# Patient Record
Sex: MALE | Race: BLACK OR AFRICAN AMERICAN | Employment: UNEMPLOYED | URBAN - METROPOLITAN AREA
[De-identification: names, ages, dates, MRNs, and addresses within clinical notes are randomized per-mention and may not be internally consistent; named-entity substitution may affect disease eponyms.]

---

## 2022-03-31 ENCOUNTER — DOCUMENTATION ONLY (OUTPATIENT)
Dept: CARDIOLOGY CLINIC | Age: 52
End: 2022-03-31

## 2022-03-31 NOTE — PROGRESS NOTES
Records requested from [facility] for appointment 4/11/2022 with Dr. Maria Victoria Valente

## 2022-04-11 ENCOUNTER — OFFICE VISIT (OUTPATIENT)
Dept: CARDIOLOGY CLINIC | Age: 52
End: 2022-04-11
Payer: COMMERCIAL

## 2022-04-11 VITALS
BODY MASS INDEX: 29.9 KG/M2 | HEIGHT: 74 IN | OXYGEN SATURATION: 99 % | HEART RATE: 61 BPM | DIASTOLIC BLOOD PRESSURE: 80 MMHG | WEIGHT: 233 LBS | SYSTOLIC BLOOD PRESSURE: 120 MMHG

## 2022-04-11 DIAGNOSIS — U07.1 COVID-19: ICD-10-CM

## 2022-04-11 DIAGNOSIS — Z09 HOSPITAL DISCHARGE FOLLOW-UP: ICD-10-CM

## 2022-04-11 DIAGNOSIS — R06.09 EXERTIONAL DYSPNEA: Primary | ICD-10-CM

## 2022-04-11 PROCEDURE — 93000 ELECTROCARDIOGRAM COMPLETE: CPT | Performed by: STUDENT IN AN ORGANIZED HEALTH CARE EDUCATION/TRAINING PROGRAM

## 2022-04-11 PROCEDURE — 99204 OFFICE O/P NEW MOD 45 MIN: CPT | Performed by: STUDENT IN AN ORGANIZED HEALTH CARE EDUCATION/TRAINING PROGRAM

## 2022-04-11 RX ORDER — DOCUSATE SODIUM 100 MG/1
100 CAPSULE, LIQUID FILLED ORAL 2 TIMES DAILY
COMMUNITY

## 2022-04-11 RX ORDER — BISMUTH SUBSALICYLATE 262 MG
1 TABLET,CHEWABLE ORAL DAILY
COMMUNITY

## 2022-04-11 NOTE — PROGRESS NOTES
Breann Dai is a 45 y.o. male    Visit Vitals  /80 (BP 1 Location: Left upper arm, BP Patient Position: Sitting, BP Cuff Size: Adult)   Pulse 61   Ht 6' 2\" (1.88 m)   Wt 233 lb (105.7 kg)   SpO2 99%   BMI 29.92 kg/m²       Chief Complaint   Patient presents with    New Patient    Other     CARDIAC ISSUES       Chest pain NO  SOB NO  Dizziness NO  Swelling LEGS  Recent hospital visit [facility], 2/15/22-2/25/22, PNEUMONIA, COVID. Refills NO  COVID VACCINE STATUS YES  HAD COVID?  YES

## 2022-04-11 NOTE — PROGRESS NOTES
[facility] [address], [address] [facility], [address] [place] [address]    Office [phone],[facility] [phone]           [facility] Ryan is a 45 y.o. male presents to the office for follow-up evaluation      Assessment/Recommendations:      ICD-10-CM ICD-9-CM    1. Exertional dyspnea  R06.00 786.09 LIPID PANEL      HEMOGLOBIN A1C WITH EAG      METABOLIC PANEL, COMPREHENSIVE      CBC W/O DIFF      ECHO ADULT COMPLETE      NUCLEAR CARDIAC STRESS TEST   2. Hospital discharge follow-up  Z09 V67.59 AMB POC EKG ROUTINE W/ 12 LEADS, INTER & REP   3. COVID-19  U07.1 079.89        Exertional dyspnea  Covid-19 1/2022    -Recommend to proceed with echocardiogram and exercise Cardiolite. If patient is unable to exercise we will proceed with Mayte Goodwin. -CBC, CMP, lipids, hemoglobin A1c      Primary Care Physician- None    Follow-up after completion above testing        Subjective:  45 y.o. presents to the office Eva Echevarria of care. Patient was concern for congestive heart failure symptoms. Patient was admitted to [facility] earlier this year with COVID-19 pneumonia. He was discharged on oxygen due to hypoxic respiratory failure. During his admission his chest x-rays demonstrated cardiomegaly with vascular congestion. Since hospital discharge he continues to have exertional dyspnea. He is without any chest pain or chest pressure symptoms. He will occasionally will have intermittent lower extremity edema. He reports no critical medical history. Not on routine medical therapy. No smoking history. He does report family history of heart disease. He is unclear of his father's diagnosis but he reports having a history of heart issues. Patient has never had a stress test or prior echocardiogram to his knowledge. No past medical history on file. No past surgical history on file.       Current Outpatient Medications:     docusate sodium (Colace) 100 mg capsule, Take 100 mg by mouth two (2) times a day., Disp: , Rfl:     multivitamin (ONE A DAY) tablet, Take 1 Tablet by mouth daily. , Disp: , Rfl:     No Known Allergies     No family history on file. Social History     Tobacco Use    Smoking status: Never Smoker    Smokeless tobacco: Never Used   Substance Use Topics    Alcohol use: Not on file    Drug use: Not on file       Review of Symptoms:  Pertinent Positive: Exertional dyspnea  Pertinent Negative: Chest pain, chest pressure, orthopnea, PND  All Other systems reviewed and are negative for a Comprehensive ROS (10+)    Physical Exam    Blood pressure 120/80, pulse 61, height 6' 2\" (1.88 m), weight 233 lb (105.7 kg), SpO2 99 %. Constitutional:  well-developed and well-nourished. No distress. HENT: Normocephalic. Eyes: No scleral icterus. Neck:  Neck supple. No JVD present. Pulmonary/Chest: Effort normal and breath sounds normal. No respiratory distress, wheezes or rales. Cardiovascular: Normal rate, regular rhythm, S1 S2 . Exam reveals no gallop and no friction rub. No murmur heard. No edema. Extremities:  Normal muscle tone  Abdominal:   No abnormal distension. Neurological:  Moving all extremities, cranial nerves appear grossly intact. Skin: Skin is not cold. Not diaphoretic. No erythema. Psychiatric:  Grossly normal mood and affect. Intact insight. Objective Data:     Investigations personally reviewed and interpreted    EC2022-sinus bradycardia, otherwise normal electrocardiogram          Investigations reviewed     Reviewed discharge paperwork dated 2022  During his hospitalization he had radiographic evidence of cardiomegaly with mild pulmonary vascular congestion  Sodium 140, K3.9, chloride 105, bicarb 33, BUN 11, creatinine 1.07  AST 22 [id number]  Hemoglobin 12.8, white count 5, platelets [id number] Gloria Zuniga DO          ATTENTION:   This medical record was transcribed using an electronic medical records/speech recognition system. Although proofread, it may and can contain electronic, spelling and other errors. Corrections may be executed at a later time. Please feel free to contact us for any clarifications as needed.

## 2022-04-21 ENCOUNTER — TELEPHONE (OUTPATIENT)
Dept: CARDIOLOGY CLINIC | Age: 52
End: 2022-04-21

## 2022-04-21 NOTE — TELEPHONE ENCOUNTER
Called patient's wife advised per   Dr Vilma Tao when he gets his stress test and echo that will address his symptoms. I informed her these were scheduled for 5/10/22. Verbalized understanding.

## 2022-04-21 NOTE — TELEPHONE ENCOUNTER
Please see below message patient's wife called ID verified X2 he is not aware that she was calling but she is very concerned about her . She stated he did not tell the truth when he was seen at [address] 4/11/22. Patient does have swelling in his legs ankles and feet. He has SOB with & without activity. Patient is to use his O2 at 2 LPM but patient does not want to wear it. When sleeping patient has to use several pillows due to SOB. Patient has an appt with PCP on 5/12/22 to establish care. Patient had labs drawn on 4/11/22 and has an Echo & Exercise stress test scheduled for 5/10/22    Please advise.

## 2022-04-21 NOTE — TELEPHONE ENCOUNTER
Patient's wife called to inform the nurse that during the patient's last visit when the patient was asked by the doctor if he had any symptoms he denied having any symptoms and the wife stated that is not the true.   The wife is concerned because   He is having symptoms, he is fatigued, leg swelling, shortness of breath, patient has to sleep on two to three pillows because he has trouble breathing, he gets dark circles under his eyes, clammy skin, the wife also stated he was told by the doctor in the hospital  (while in for covid) that he has a problem with his liver, heart, prostate and kidneys, please advise      [facility]  [phone]

## 2022-05-09 ENCOUNTER — TELEPHONE (OUTPATIENT)
Dept: CARDIOLOGY CLINIC | Age: 52
End: 2022-05-09

## 2022-05-09 NOTE — TELEPHONE ENCOUNTER
ID verified per protocol x2.  Patient's wife confirmed for nuclear stress test appointment reminder including date, time, location, prep, and duration of test. Patient's wife verbalized understanding and agrees with prep/test.

## 2022-05-10 ENCOUNTER — ANCILLARY PROCEDURE (OUTPATIENT)
Dept: CARDIOLOGY CLINIC | Age: 52
End: 2022-05-10
Payer: MEDICAID

## 2022-05-10 ENCOUNTER — ANCILLARY PROCEDURE (OUTPATIENT)
Dept: CARDIOLOGY CLINIC | Age: 52
End: 2022-05-10

## 2022-05-10 VITALS — HEIGHT: 74 IN | BODY MASS INDEX: 29.9 KG/M2 | WEIGHT: 233 LBS

## 2022-05-10 VITALS
SYSTOLIC BLOOD PRESSURE: 122 MMHG | BODY MASS INDEX: 29.9 KG/M2 | WEIGHT: 233 LBS | HEIGHT: 74 IN | DIASTOLIC BLOOD PRESSURE: 78 MMHG

## 2022-05-10 DIAGNOSIS — R06.09 EXERTIONAL DYSPNEA: ICD-10-CM

## 2022-05-10 PROCEDURE — A9500 TC99M SESTAMIBI: HCPCS | Performed by: STUDENT IN AN ORGANIZED HEALTH CARE EDUCATION/TRAINING PROGRAM

## 2022-05-10 PROCEDURE — 93306 TTE W/DOPPLER COMPLETE: CPT | Performed by: STUDENT IN AN ORGANIZED HEALTH CARE EDUCATION/TRAINING PROGRAM

## 2022-05-10 PROCEDURE — 78452 HT MUSCLE IMAGE SPECT MULT: CPT | Performed by: STUDENT IN AN ORGANIZED HEALTH CARE EDUCATION/TRAINING PROGRAM

## 2022-05-10 PROCEDURE — 93015 CV STRESS TEST SUPVJ I&R: CPT | Performed by: STUDENT IN AN ORGANIZED HEALTH CARE EDUCATION/TRAINING PROGRAM

## 2022-05-10 RX ORDER — TETRAKIS(2-METHOXYISOBUTYLISOCYANIDE)COPPER(I) TETRAFLUOROBORATE 1 MG/ML
10 INJECTION, POWDER, LYOPHILIZED, FOR SOLUTION INTRAVENOUS ONCE
Status: COMPLETED | OUTPATIENT
Start: 2022-05-10 | End: 2022-05-10

## 2022-05-10 RX ORDER — TETRAKIS(2-METHOXYISOBUTYLISOCYANIDE)COPPER(I) TETRAFLUOROBORATE 1 MG/ML
30 INJECTION, POWDER, LYOPHILIZED, FOR SOLUTION INTRAVENOUS ONCE
Status: COMPLETED | OUTPATIENT
Start: 2022-05-10 | End: 2022-05-10

## 2022-05-10 RX ADMIN — TETRAKIS(2-METHOXYISOBUTYLISOCYANIDE)COPPER(I) TETRAFLUOROBORATE 7.8 MILLICURIE: 1 INJECTION, POWDER, LYOPHILIZED, FOR SOLUTION INTRAVENOUS at 09:25

## 2022-05-10 RX ADMIN — TETRAKIS(2-METHOXYISOBUTYLISOCYANIDE)COPPER(I) TETRAFLUOROBORATE 25.5 MILLICURIE: 1 INJECTION, POWDER, LYOPHILIZED, FOR SOLUTION INTRAVENOUS at 10:35

## 2022-05-11 LAB
ECHO AO ASC DIAM: 3.5 CM
ECHO AO ASCENDING AORTA INDEX: 1.51 CM/M2
ECHO AO ROOT DIAM: 3.5 CM
ECHO AO ROOT INDEX: 1.51 CM/M2
ECHO AV AREA PEAK VELOCITY: 3.2 CM2
ECHO AV AREA VTI: 3.4 CM2
ECHO AV AREA/BSA PEAK VELOCITY: 1.4 CM2/M2
ECHO AV AREA/BSA VTI: 1.5 CM2/M2
ECHO AV MEAN GRADIENT: 4 MMHG
ECHO AV MEAN VELOCITY: 0.9 M/S
ECHO AV PEAK GRADIENT: 7 MMHG
ECHO AV PEAK VELOCITY: 1.4 M/S
ECHO AV VELOCITY RATIO: 0.71
ECHO AV VTI: 27.6 CM
ECHO EST RA PRESSURE: 3 MMHG
ECHO LA DIAMETER INDEX: 1.94 CM/M2
ECHO LA DIAMETER: 4.5 CM
ECHO LA TO AORTIC ROOT RATIO: 1.29
ECHO LA VOL 2C: 52 ML (ref 18–58)
ECHO LA VOL 4C: 69 ML (ref 18–58)
ECHO LA VOL BP: 67 ML (ref 18–58)
ECHO LA VOL/BSA BIPLANE: 29 ML/M2 (ref 16–34)
ECHO LA VOLUME AREA LENGTH: 71 ML
ECHO LA VOLUME INDEX A2C: 22 ML/M2 (ref 16–34)
ECHO LA VOLUME INDEX A4C: 30 ML/M2 (ref 16–34)
ECHO LA VOLUME INDEX AREA LENGTH: 31 ML/M2 (ref 16–34)
ECHO LV E' LATERAL VELOCITY: 9 CM/S
ECHO LV E' SEPTAL VELOCITY: 8 CM/S
ECHO LV EDV A2C: 137 ML
ECHO LV EDV A4C: 150 ML
ECHO LV EDV BP: 147 ML (ref 67–155)
ECHO LV EDV INDEX A4C: 65 ML/M2
ECHO LV EDV INDEX BP: 63 ML/M2
ECHO LV EDV NDEX A2C: 59 ML/M2
ECHO LV EJECTION FRACTION A2C: 55 %
ECHO LV EJECTION FRACTION A4C: 62 %
ECHO LV EJECTION FRACTION BIPLANE: 59 % (ref 55–100)
ECHO LV ESV A2C: 62 ML
ECHO LV ESV A4C: 56 ML
ECHO LV ESV BP: 60 ML (ref 22–58)
ECHO LV ESV INDEX A2C: 27 ML/M2
ECHO LV ESV INDEX A4C: 24 ML/M2
ECHO LV ESV INDEX BP: 26 ML/M2
ECHO LV FRACTIONAL SHORTENING: 30 % (ref 28–44)
ECHO LV INTERNAL DIMENSION DIASTOLE INDEX: 1.9 CM/M2
ECHO LV INTERNAL DIMENSION DIASTOLIC: 4.4 CM (ref 4.2–5.9)
ECHO LV INTERNAL DIMENSION SYSTOLIC INDEX: 1.34 CM/M2
ECHO LV INTERNAL DIMENSION SYSTOLIC: 3.1 CM
ECHO LV IVSD: 1 CM (ref 0.6–1)
ECHO LV MASS 2D: 147.8 G (ref 88–224)
ECHO LV MASS INDEX 2D: 63.7 G/M2 (ref 49–115)
ECHO LV POSTERIOR WALL DIASTOLIC: 1 CM (ref 0.6–1)
ECHO LV RELATIVE WALL THICKNESS RATIO: 0.45
ECHO LVOT AREA: 4.2 CM2
ECHO LVOT AV VTI INDEX: 0.8
ECHO LVOT DIAM: 2.3 CM
ECHO LVOT MEAN GRADIENT: 2 MMHG
ECHO LVOT PEAK GRADIENT: 4 MMHG
ECHO LVOT PEAK VELOCITY: 1 M/S
ECHO LVOT STROKE VOLUME INDEX: 39.4 ML/M2
ECHO LVOT SV: 91.4 ML
ECHO LVOT VTI: 22 CM
ECHO MV A VELOCITY: 0.84 M/S
ECHO MV AREA PHT: 2.8 CM2
ECHO MV E DECELERATION TIME (DT): 274.1 MS
ECHO MV E VELOCITY: 0.66 M/S
ECHO MV E/A RATIO: 0.79
ECHO MV E/E' LATERAL: 7.33
ECHO MV E/E' RATIO (AVERAGED): 7.79
ECHO MV E/E' SEPTAL: 8.25
ECHO MV PRESSURE HALF TIME (PHT): 79.5 MS
ECHO RIGHT VENTRICULAR SYSTOLIC PRESSURE (RVSP): 30 MMHG
ECHO RV FREE WALL PEAK S': 10 CM/S
ECHO RV INTERNAL DIMENSION: 3.6 CM
ECHO RV TAPSE: 2 CM (ref 1.7–?)
ECHO TV REGURGITANT MAX VELOCITY: 2.62 M/S
ECHO TV REGURGITANT PEAK GRADIENT: 27 MMHG
NUC STRESS EJECTION FRACTION: 50 %
STRESS BASELINE DIAS BP: 78 MMHG
STRESS BASELINE HR: 65 BPM
STRESS BASELINE ST DEPRESSION: 0 MM
STRESS BASELINE SYS BP: 122 MMHG
STRESS O2 SAT PEAK: 92 %
STRESS O2 SAT REST: 100 %
STRESS PEAK DIAS BP: 80 MMHG
STRESS PEAK SYS BP: 140 MMHG
STRESS PERCENT HR ACHIEVED: 75 %
STRESS POST PEAK HR: 125 BPM
STRESS RATE PRESSURE PRODUCT: NORMAL BPM*MMHG
STRESS TARGET HR: 166 BPM

## 2022-05-12 ENCOUNTER — OFFICE VISIT (OUTPATIENT)
Dept: PRIMARY CARE CLINIC | Age: 52
End: 2022-05-12
Payer: MEDICAID

## 2022-05-12 VITALS
RESPIRATION RATE: 20 BRPM | SYSTOLIC BLOOD PRESSURE: 124 MMHG | HEIGHT: 74 IN | DIASTOLIC BLOOD PRESSURE: 70 MMHG | TEMPERATURE: 97.4 F | OXYGEN SATURATION: 98 % | BODY MASS INDEX: 30.83 KG/M2 | WEIGHT: 240.2 LBS | HEART RATE: 80 BPM

## 2022-05-12 DIAGNOSIS — Z86.16 HISTORY OF COVID-19: Primary | ICD-10-CM

## 2022-05-12 DIAGNOSIS — Z99.81 REQUIRES CONTINUOUS AT HOME SUPPLEMENTAL OXYGEN: ICD-10-CM

## 2022-05-12 PROCEDURE — 99203 OFFICE O/P NEW LOW 30 MIN: CPT | Performed by: FAMILY MEDICINE

## 2022-05-12 NOTE — PROGRESS NOTES
1. \"Have you been to the ER, urgent care clinic since your last visit? Hospitalized since your last visit? \" No    2. \"Have you seen or consulted any other health care providers outside of the [address] [place] [address] since your last visit? \" No     3. For patients aged 39-70: Has the patient had a colonoscopy / FIT/ Cologuard? No      If the patient is female:    4. For patients aged 41-77: Has the patient had a mammogram within the past 2 years? NA-based on age or sex      11. For patients aged 21-65: Has the patient had a pap smear?  NA - based on age or sex  Visit Vitals  /70 (BP 1 Location: Right upper arm, BP Patient Position: Sitting, BP Cuff Size: Large adult)   Pulse 80   Temp 97.4 °F (36.3 °C) (Temporal)   Resp 20   Ht 6' 2\" (1.88 m)   Wt 240 lb 3.2 oz (109 kg)   SpO2 98% Comment: O2 via NC 2l/min   BMI 30.84 kg/m²     Chief Complaint   Patient presents with   [place] (+) 01/2022

## 2022-05-12 NOTE — PROGRESS NOTES
HPI     Chief Complaint:   Chief Complaint   Patient presents with   [place] (+) 01/2022      Kenya Betancourt is an 45 y.o. male who presents to [facility] care. Medical history significant for:   Patient Active Problem List   Diagnosis Code    History of COVID-19 Z86.16    Requires continuous at home supplemental oxygen Z99.81       Concerns for today:     History of COVID 19: January 2022, hospitalized at [facility]. Was hospitalized for 10 days and now is on continuous O2 at 2L including night-time O2. He is following with Dr. Sotero Ortiz.    Patient says God told him not to take the COVID vaccine so that is not an option for him. He says he is not aware of Pulmonary Rehab or inhalers that could help with his symptoms but admits he has not wanted much intervention. Current medications include:   Current Outpatient Medications   Medication Sig    multivits,Stress Formula-Zinc tablet Take  by mouth daily.  docusate sodium (Colace) 100 mg capsule Take 100 mg by mouth two (2) times a day.  multivitamin (ONE A DAY) tablet Take 1 Tablet by mouth daily. No current facility-administered medications for this visit. Allergies - reviewed:   No Known Allergies      Past Medical History - reviewed:  History reviewed. No pertinent past medical history. Social History - reviewed:  Social History     Tobacco Use    Smoking status: Never Smoker    Smokeless tobacco: Never Used   Vaping Use    Vaping Use: Never used   Substance Use Topics    Alcohol use: Not Currently    Drug use: Never       Past Surgical History - reviewed:  History reviewed. No pertinent surgical history. Family History - reviewed:  History reviewed. No pertinent family history. Immunizations - reviewed: There is no immunization history on file for this patient. Review of Systems   Review of Systems   Constitutional: Negative for chills and fever.    Respiratory: Positive for cough and sputum production. Cardiovascular: Negative for chest pain and palpitations. Neurological: Negative for dizziness and headaches. Objective     Visit Vitals  /70 (BP 1 Location: Right upper arm, BP Patient Position: Sitting, BP Cuff Size: Large adult)   Pulse 80   Temp 97.4 °F (36.3 °C) (Temporal)   Resp 20   Ht 6' 2\" (1.88 m)   Wt 240 lb 3.2 oz (109 kg)   SpO2 98% Comment: O2 via NC 2l/min   BMI 30.84 kg/m²       Physical Exam  Vitals and nursing note reviewed. Constitutional:       General: He is not in acute distress. Cardiovascular:      Rate and Rhythm: Normal rate and regular rhythm. Pulmonary:      Effort: Pulmonary effort is normal. No respiratory distress. Breath sounds: Normal breath sounds. Neurological:      General: No focal deficit present. Mental Status: He is alert and oriented to person, place, and time. Psychiatric:         Mood and Affect: Mood normal.         Behavior: Behavior normal.             Assessment and Plan     Checking baseline labs. Patient followed by Pulmonology and is using continuous oxygen. He is due for physical and will return fasting for this. Diagnoses and all orders for this visit:    1. History of COVID-19  -     CBC W/O DIFF  -     CBC WITH AUTOMATED DIFF  -     METABOLIC PANEL, COMPREHENSIVE    2. Requires continuous at home supplemental oxygen       Follow-up and Dispositions    Return in about 1 month (around 6/12/2022) for annual physical.  Follow-up and Disposition History       I discussed the aforementioned diagnoses with the patient as well as the plan of care. All questions were answered and an AVS was provided.      Alejo Licea MD  [facility]  [address], [place], [address] [place] [address]

## 2022-05-13 LAB
ALBUMIN SERPL-MCNC: 4.2 G/DL (ref 3.8–4.9)
ALBUMIN/GLOB SERPL: 1.6 {RATIO} (ref 1.2–2.2)
ALP SERPL-CCNC: 69 IU/L (ref 44–121)
ALT SERPL-CCNC: 17 IU/L (ref 0–44)
AST SERPL-CCNC: 15 IU/L (ref 0–40)
BASOPHILS # BLD AUTO: 0 X10E3/UL (ref 0–0.2)
BASOPHILS NFR BLD AUTO: 1 %
BILIRUB SERPL-MCNC: 0.3 MG/DL (ref 0–1.2)
BUN SERPL-MCNC: 8 MG/DL (ref 6–24)
BUN/CREAT SERPL: 7 (ref 9–20)
CALCIUM SERPL-MCNC: 10 MG/DL (ref 8.7–10.2)
CHLORIDE SERPL-SCNC: 102 MMOL/L (ref 96–106)
CO2 SERPL-SCNC: 27 MMOL/L (ref 20–29)
CREAT SERPL-MCNC: 1.11 MG/DL (ref 0.76–1.27)
EGFR: 79 ML/MIN/1.73
EOSINOPHIL # BLD AUTO: 0.2 X10E3/UL (ref 0–0.4)
EOSINOPHIL NFR BLD AUTO: 3 %
ERYTHROCYTE [DISTWIDTH] IN BLOOD BY AUTOMATED COUNT: 12.3 % (ref 11.6–15.4)
GLOBULIN SER CALC-MCNC: 2.7 G/DL (ref 1.5–4.5)
GLUCOSE SERPL-MCNC: 111 MG/DL (ref 65–99)
HCT VFR BLD AUTO: 38.5 % (ref 37.5–51)
HGB BLD-MCNC: 13.4 G/DL (ref 13–17.7)
IMM GRANULOCYTES # BLD AUTO: 0 X10E3/UL (ref 0–0.1)
IMM GRANULOCYTES NFR BLD AUTO: 0 %
LYMPHOCYTES # BLD AUTO: 2.5 X10E3/UL (ref 0.7–3.1)
LYMPHOCYTES NFR BLD AUTO: 51 %
MCH RBC QN AUTO: 32.6 PG (ref 26.6–33)
MCHC RBC AUTO-ENTMCNC: 34.8 G/DL (ref 31.5–35.7)
MCV RBC AUTO: 94 FL (ref 79–97)
MONOCYTES # BLD AUTO: 0.5 X10E3/UL (ref 0.1–0.9)
MONOCYTES NFR BLD AUTO: 11 %
NEUTROPHILS # BLD AUTO: 1.6 X10E3/UL (ref 1.4–7)
NEUTROPHILS NFR BLD AUTO: 34 %
PLATELET # BLD AUTO: 172 X10E3/UL (ref 150–450)
POTASSIUM SERPL-SCNC: 4.3 MMOL/L (ref 3.5–5.2)
PROT SERPL-MCNC: 6.9 G/DL (ref 6–8.5)
RBC # BLD AUTO: 4.11 X10E6/UL (ref 4.14–5.8)
SODIUM SERPL-SCNC: 141 MMOL/L (ref 134–144)
WBC # BLD AUTO: 4.8 X10E3/UL (ref 3.4–10.8)

## 2022-05-13 NOTE — PROGRESS NOTES
Alcon Epps,    Your blood work has returned. Your blood counts, kidney function, liver function, and electrolytes are within acceptable ranges. Your glucose was slightly elevated, so let's be mindful of this regarding your diet. Thank you for the privilege to participate in your healthcare.     Dr. Makenzie Antunez

## 2022-05-20 ENCOUNTER — OFFICE VISIT (OUTPATIENT)
Dept: CARDIOLOGY CLINIC | Age: 52
End: 2022-05-20
Payer: MEDICAID

## 2022-05-20 VITALS
WEIGHT: 240 LBS | SYSTOLIC BLOOD PRESSURE: 124 MMHG | DIASTOLIC BLOOD PRESSURE: 78 MMHG | HEART RATE: 60 BPM | OXYGEN SATURATION: 92 % | BODY MASS INDEX: 30.8 KG/M2 | HEIGHT: 74 IN

## 2022-05-20 DIAGNOSIS — Z86.16 HISTORY OF COVID-19: ICD-10-CM

## 2022-05-20 DIAGNOSIS — R06.09 EXERTIONAL DYSPNEA: Primary | ICD-10-CM

## 2022-05-20 PROCEDURE — 99214 OFFICE O/P EST MOD 30 MIN: CPT | Performed by: STUDENT IN AN ORGANIZED HEALTH CARE EDUCATION/TRAINING PROGRAM

## 2022-05-20 NOTE — PROGRESS NOTES
[facility] [address], [address] [facility], [address] [place] [address]    Office [phone],[facility] [phone]           Andrew Ruiz is a 45 y.o. male presents to the office for follow-up evaluation      Assessment/Recommendations:      ICD-10-CM ICD-9-CM    1. Exertional dyspnea  R06.00 786.09    2. History of COVID-19  Z86.16 V12.09        Exertional dyspnea  Covid-19 1/2022    Recent echocardiogram and stress test showed normal LV function without any significant valvular pathology. No exercise-induced ischemia. I suspect his exertional dyspnea is related to his COVID-19 infection. His recent labs showed very mild insulin resistance with a hemoglobin A1c of 5.7. His lipids are very well controlled. Blood pressure is stable. Primary Care Physician- Maryjo Buerger, MD    Follow-up or sooner as needed        Subjective:  45 y.o. presents to the office for follow-up evaluation. Continues to have class II dyspnea symptoms. No ongoing chest pain or chest pressure symptoms. Current Outpatient Medications:     multivits,Stress Formula-Zinc tablet, Take  by mouth daily. , Disp: , Rfl:     docusate sodium (Colace) 100 mg capsule, Take 100 mg by mouth two (2) times a day., Disp: , Rfl:     multivitamin (ONE A DAY) tablet, Take 1 Tablet by mouth daily. , Disp: , Rfl:     No Known Allergies     History reviewed. No pertinent family history.     Social History     Tobacco Use    Smoking status: Never Smoker    Smokeless tobacco: Never Used   Vaping Use    Vaping Use: Never used   Substance Use Topics    Alcohol use: Not Currently    Drug use: Never       Review of Symptoms:  Pertinent Positive: Exertional dyspnea  Pertinent Negative: Chest pain, chest pressure, orthopnea, PND  All Other systems reviewed and are negative for a Comprehensive ROS (10+)    Physical Exam    Blood pressure 124/78, pulse 60, height 6' 2\" (1.88 m), weight 240 lb (108.9 kg), SpO2 92 %.  Constitutional:  well-developed and well-nourished. No distress. HENT: Normocephalic. Eyes: No scleral icterus. Neck:  Neck supple. No JVD present. Pulmonary/Chest: Effort normal and breath sounds normal. No respiratory distress, wheezes or rales. Cardiovascular: Normal rate, regular rhythm, S1 S2 . Exam reveals no gallop and no friction rub. No murmur heard. No edema. Extremities:  Normal muscle tone  Abdominal:   No abnormal distension. Neurological:  Moving all extremities, cranial nerves appear grossly intact. Skin: Skin is not cold. Not diaphoretic. No erythema. Psychiatric:  Grossly normal mood and affect. Intact insight. Objective Data: Investigations personally reviewed and interpreted    EC2022-sinus bradycardia, otherwise normal electrocardiogram          Investigations reviewed     05/10/22    ECHO ADULT COMPLETE 2022    Interpretation Summary    Left Ventricle: Normal left ventricular systolic function with a visually estimated EF of 55 - 60%. Left ventricle size is normal. Normal wall thickness. Normal wall motion. Normal diastolic function.   Aortic Valve: Tricuspid valve.   Mitral Valve: Trace regurgitation.   Tricuspid Valve: Trace regurgitation. Signed by: Lucinda Gan DO on 2022  8:57 PM    05/10/22    NUCLEAR CARDIAC STRESS TEST 2022    Interpretation Summary    Nuclear Findings: Normal left ventricular systolic function post-stress. LVEF measures 50%.   Nuclear Findings: LV perfusion is normal.    Nuclear Findings: Normal pharmacological myocardial perfusion study.   ECG: Resting ECG demonstrates normal sinus rhythm.   Stress Test: The patient walked the treadmill for reaching a max HR of 125, Mets 6.8 before stopping due to fatigue. Protocol switched to [facility]. A pharmacological stress test was performed using lexiscan. Hemodynamics are adequate for diagnosis.  Blood pressure demonstrated a normal response at rest and to stress. The heart rate demonstrated a normal response to stress. The patient's heart rate recovery was normal. The patient reported dyspnea and no chest pain during the stress test.    Signed by: Van Harding DO on 5/11/2022  9:02 PM            Isaias E Chanelle Madrid DO          ATTENTION:   This medical record was transcribed using an electronic medical records/speech recognition system. Although proofread, it may and can contain electronic, spelling and other errors. Corrections may be executed at a later time. Please feel free to contact us for any clarifications as needed.

## 2022-05-20 NOTE — PROGRESS NOTES
Zuly Orozco is a 45 y.o. male    Chief Complaint   Patient presents with    Follow-up     NUC/ECHO 5/10/22, [facility]       Chest pain No    SOB No    Dizziness No    Swelling some in his legs and feet     Refills No    Visit Vitals  /78 (BP 1 Location: Right arm, BP Patient Position: Sitting)   Pulse 60   Ht 6' 2\" (1.88 m)   Wt 240 lb (108.9 kg)   SpO2 92%   BMI 30.81 kg/m²       1. Have you been to the ER, urgent care clinic since your last visit? Hospitalized since your last visit? No    2. Have you seen or consulted any other health care providers outside of the [address] [place] [address] since your last visit? Include any pap smears or colon screening.   No

## 2022-05-20 NOTE — LETTER
5/20/2022    Patient: Tushar Vazquez   YOB: 1970   Date of Visit: 5/20/2022     Rosa Maria Little MD  [facility] [address] [id number]  [address] [facility] [address]    Dear Rosa Maria Little MD,      Thank you for referring Mr. Tushar Vazquez to [address]  for evaluation. My notes for this consultation are attached. If you have questions, please do not hesitate to call me. I look forward to following your patient along with you.       Sincerely,    Shannen Harry, DO

## 2022-06-24 ENCOUNTER — OFFICE VISIT (OUTPATIENT)
Dept: PRIMARY CARE CLINIC | Age: 52
End: 2022-06-24
Payer: MEDICAID

## 2022-06-24 VITALS
RESPIRATION RATE: 20 BRPM | DIASTOLIC BLOOD PRESSURE: 64 MMHG | SYSTOLIC BLOOD PRESSURE: 109 MMHG | HEART RATE: 64 BPM | TEMPERATURE: 97.4 F | HEIGHT: 74 IN | WEIGHT: 242.2 LBS | OXYGEN SATURATION: 98 % | BODY MASS INDEX: 31.08 KG/M2

## 2022-06-24 DIAGNOSIS — Z86.16 HISTORY OF COVID-19: ICD-10-CM

## 2022-06-24 DIAGNOSIS — Z99.81 REQUIRES CONTINUOUS AT HOME SUPPLEMENTAL OXYGEN: ICD-10-CM

## 2022-06-24 DIAGNOSIS — E66.9 OBESITY (BMI 30-39.9): ICD-10-CM

## 2022-06-24 DIAGNOSIS — Z00.00 WELL ADULT EXAM: Primary | ICD-10-CM

## 2022-06-24 DIAGNOSIS — U09.9 COVID-19 LONG HAULER MANIFESTING CHRONIC DYSPNEA: ICD-10-CM

## 2022-06-24 DIAGNOSIS — Z13.6 ENCOUNTER FOR SCREENING FOR CARDIOVASCULAR DISORDERS: ICD-10-CM

## 2022-06-24 DIAGNOSIS — Z11.59 NEED FOR HEPATITIS C SCREENING TEST: ICD-10-CM

## 2022-06-24 DIAGNOSIS — R06.09 COVID-19 LONG HAULER MANIFESTING CHRONIC DYSPNEA: ICD-10-CM

## 2022-06-24 DIAGNOSIS — Z12.5 SCREENING FOR MALIGNANT NEOPLASM OF PROSTATE: ICD-10-CM

## 2022-06-24 DIAGNOSIS — R73.03 PREDIABETES: ICD-10-CM

## 2022-06-24 PROCEDURE — 99396 PREV VISIT EST AGE 40-64: CPT | Performed by: FAMILY MEDICINE

## 2022-06-24 NOTE — PROGRESS NOTES
1. \"Have you been to the ER, urgent care clinic since your last visit? Hospitalized since your last visit? \" No    2. \"Have you seen or consulted any other health care providers outside of the [address] [place] [address] since your last visit? \" No     3. For patients aged 39-70: Has the patient had a colonoscopy / FIT/ Cologuard? No      If the patient is female:    4. For patients aged 41-77: Has the patient had a mammogram within the past 2 years? NA - based on age or sex      11. For patients aged 21-65: Has the patient had a pap smear?  NA - based on age or sex  Visit Vitals  /64 (BP 1 Location: Right upper arm, BP Patient Position: Sitting, BP Cuff Size: Large adult)   Pulse 64   Temp 97.4 °F (36.3 °C) (Temporal)   Resp 20   Ht 6' 2\" (1.88 m)   Wt 242 lb 3.2 oz (109.9 kg)   SpO2 98%   BMI 31.10 kg/m²     Chief Complaint   Patient presents with    Physical

## 2022-06-24 NOTE — PROGRESS NOTES
HPI     Chief Complaint   Patient presents with    Physical       Aj Vance is a 45 y.o. male presenting for well male exam and is also due for follow up care of chronic issues. Aj Vance is willing to do both appointments today and realizes that there may be a co-pay for the follow-up portion of the visit. Specialists: Dr. Irma Angel, Cardiology and Dr. Mayra Manning. \"Lungs are function at 58% capacity\". Occupation: not working since having Manoj Ferrera. 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 Health Maintenance reviewed -  HCV screening-due  PHQ2 Score = negative  PSA: Completing today  Colonoscopy patient declined  Low dose CT scan-not indicated  AAA screening-not indicated  COVID-19 vaccinations: Refused. \"God told me not to get it\"  TDaP: Declines  Shingles: Declines      Acute Problems:     Long haul COVID with dyspnea: patient had COVID 19, hospitalized. He's been dependent on oxygen since then. Follows with Pulmonology, had not had any medication recommendations. He states Dr. Mayra Manning did mention that Mr. Latasha Roman should consider long-term disability given his current state. Prediabetes: Patient states he is working on healthy lifestyle. Diabetes is present on both his maternal and paternal side. Lab Results   Component Value Date/Time    Hemoglobin A1c 5.7 (H) 04/11/2022 11:01 AM      Obesity: Patient is not very active as he is oxygen dependent at this time. Weight Metrics 6/24/2022 5/20/2022 5/12/2022 5/10/2022 5/10/2022 4/11/2022   Weight 242 lb 3.2 oz 240 lb 240 lb 3.2 oz 233 lb 233 lb 233 lb   BMI 31.1 kg/m2 30.81 kg/m2 30.84 kg/m2 29.92 kg/m2 29.92 kg/m2 29.92 kg/m2       Allergies- reviewed  No Known Allergies    Medications- reviewed  Current Outpatient Medications   Medication Sig    multivits,Stress Formula-Zinc tablet Take  by mouth daily.  docusate sodium (Colace) 100 mg capsule Take 100 mg by mouth two (2) times a day.  multivitamin (ONE A DAY) tablet Take 1 Tablet by mouth daily.      No current facility-administered medications for this visit. Immunizations - reviewed: There is no immunization history on file for this patient. Flu: recommended every fall. Review of Systems   Review of Systems   Constitutional: Negative for chills and fever. Eyes: Negative for discharge and redness. Respiratory: Positive for shortness of breath. Cardiovascular: Negative for chest pain and palpitations. Gastrointestinal: Negative for abdominal pain and blood in stool. Musculoskeletal: Negative for falls and myalgias. Skin: Negative for rash. Neurological: Negative for focal weakness and headaches. Endo/Heme/Allergies: Negative for polydipsia. Does not bruise/bleed easily. Psychiatric/Behavioral: Negative for hallucinations and substance abuse. Reviewed PmHx, FmHx, SocHx as well as meds and allergies, updated and dated in the chart. Social History     Tobacco Use    Smoking status: Never Smoker    Smokeless tobacco: Never Used   Vaping Use    Vaping Use: Never used   Substance Use Topics    Alcohol use: Not Currently    Drug use: Never     History reviewed. No pertinent past medical history. Family History   Problem Relation Age of Onset    Diabetes Mother     Diabetes Father           Objective     Physical Exam  Visit Vitals  /64 (BP 1 Location: Right upper arm, BP Patient Position: Sitting, BP Cuff Size: Large adult)   Pulse 64   Temp 97.4 °F (36.3 °C) (Temporal)   Resp 20   Ht 6' 2\" (1.88 m)   Wt 242 lb 3.2 oz (109.9 kg)   SpO2 98%   BMI 31.10 kg/m²       Physical Exam  Vitals and nursing note reviewed. Constitutional:       General: He is not in acute distress. Appearance: Normal appearance. HENT:      Head: Normocephalic and atraumatic.       Right Ear: Tympanic membrane and external ear normal.      Left Ear: Tympanic membrane and external ear normal.      Nose: Nose normal.      Mouth/Throat:      Mouth: Mucous membranes are moist.      Pharynx: No oropharyngeal exudate. Eyes:      Extraocular Movements: Extraocular movements intact. Conjunctiva/sclera: Conjunctivae normal.      Pupils: Pupils are equal, round, and reactive to light. Cardiovascular:      Rate and Rhythm: Normal rate and regular rhythm. Heart sounds: No murmur heard. Pulmonary:      Effort: Pulmonary effort is normal. No respiratory distress. Breath sounds: Normal breath sounds. Abdominal:      General: Bowel sounds are normal. There is no distension. Musculoskeletal:         General: Normal range of motion. Cervical back: Normal range of motion and neck supple. Right lower leg: No edema. Left lower leg: No edema. Skin:     General: Skin is warm. Coloration: Skin is not jaundiced. Neurological:      General: No focal deficit present. Mental Status: He is alert and oriented to person, place, and time. Psychiatric:         Mood and Affect: Mood normal.         Behavior: Behavior normal.               Assessment and Plan     Diagnoses and all orders for this visit:    1. Well adult exam  Comments:  Age-appropriate guidance and health maintenance updated. Patient refuses vaccinations, colonoscopy. Agrees to labs. Orders:  -     CBC W/O DIFF  -     METABOLIC PANEL, COMPREHENSIVE  -     LIPID PANEL  -     PSA W/ REFLX FREE PSA  -     HEPATITIS C AB, RFLX TO QT BY PCR    2. COVID-19 long hauler manifesting chronic dyspnea  Comments:  Patient declines referral to [place] [address] clinic until he figures out long-term disability. I am requesting records from Dr. Torsten Sanchez. 3. History of COVID-19    4. Requires continuous at home supplemental oxygen    5. Obesity (BMI 30-39. 9)  Comments:  I encouraged healthy lifestyle. Ability to exercise is limited due to long-haul COVID. Orders:  -     CBC W/O DIFF  -     METABOLIC PANEL, COMPREHENSIVE    6. Prediabetes  Comments:  Counseled on healthy diet and lifestyle changes.   We will continue to monitor on labs. 7. Need for hepatitis C screening test  -     HEPATITIS C AB, RFLX TO QT BY PCR    8. Encounter for screening for cardiovascular disorders  -     LIPID PANEL    9. Screening for malignant neoplasm of prostate  -     PSA W/ REFLX FREE PSA       Follow-up and Dispositions    Return in about 1 year (around 6/24/2023) for annual physical.         I discussed the aforementioned diagnoses with the patient as well as the plan of care. All questions were answered and an AVS was provided.        Tamanna Barrera MD  [address] [place] [address]

## 2022-06-25 LAB
ALBUMIN SERPL-MCNC: 4.1 G/DL (ref 3.8–4.9)
ALBUMIN/GLOB SERPL: 1.5 {RATIO} (ref 1.2–2.2)
ALP SERPL-CCNC: 73 IU/L (ref 44–121)
ALT SERPL-CCNC: 17 IU/L (ref 0–44)
AST SERPL-CCNC: 20 IU/L (ref 0–40)
BILIRUB SERPL-MCNC: 0.3 MG/DL (ref 0–1.2)
BUN SERPL-MCNC: 11 MG/DL (ref 6–24)
BUN/CREAT SERPL: 10 (ref 9–20)
CALCIUM SERPL-MCNC: 9.8 MG/DL (ref 8.7–10.2)
CHLORIDE SERPL-SCNC: 104 MMOL/L (ref 96–106)
CHOLEST SERPL-MCNC: 173 MG/DL (ref 100–199)
CO2 SERPL-SCNC: 25 MMOL/L (ref 20–29)
CREAT SERPL-MCNC: 1.07 MG/DL (ref 0.76–1.27)
EGFR: 82 ML/MIN/1.73
ERYTHROCYTE [DISTWIDTH] IN BLOOD BY AUTOMATED COUNT: 12.1 % (ref 11.6–15.4)
GLOBULIN SER CALC-MCNC: 2.7 G/DL (ref 1.5–4.5)
GLUCOSE SERPL-MCNC: 124 MG/DL (ref 65–99)
HCT VFR BLD AUTO: 38.9 % (ref 37.5–51)
HCV AB S/CO SERPL IA: <0.1 S/CO RATIO (ref 0–0.9)
HCV AB SERPL QL IA: NORMAL
HDLC SERPL-MCNC: 44 MG/DL
HGB BLD-MCNC: 13.8 G/DL (ref 13–17.7)
LDLC SERPL CALC-MCNC: 94 MG/DL (ref 0–99)
MCH RBC QN AUTO: 32.4 PG (ref 26.6–33)
MCHC RBC AUTO-ENTMCNC: 35.5 G/DL (ref 31.5–35.7)
MCV RBC AUTO: 91 FL (ref 79–97)
PLATELET # BLD AUTO: 174 X10E3/UL (ref 150–450)
POTASSIUM SERPL-SCNC: 4.3 MMOL/L (ref 3.5–5.2)
PROT SERPL-MCNC: 6.8 G/DL (ref 6–8.5)
PSA SERPL-MCNC: 1.1 NG/ML (ref 0–4)
RBC # BLD AUTO: 4.26 X10E6/UL (ref 4.14–5.8)
REFLEX CRITERIA: NORMAL
SODIUM SERPL-SCNC: 142 MMOL/L (ref 134–144)
TRIGL SERPL-MCNC: 207 MG/DL (ref 0–149)
VLDLC SERPL CALC-MCNC: 35 MG/DL (ref 5–40)
WBC # BLD AUTO: 5 X10E3/UL (ref 3.4–10.8)

## 2022-06-27 NOTE — PROGRESS NOTES
Pls call patient. Blood counts normal.  Kidney function and liver function normal. Blood sugar is slightly high, consistent with prediabetes, as we know. Prostate cancer screening negative. Cholesterol levels high, risk of heart attack and/or stroke in next 10 years almost at 5%. Please encourage heart healthy diet.  We will monitor this.    -------  The 10-year ASCVD risk score (Angel Benz, et al., 2013) is: 4.8%

## 2022-07-11 ENCOUNTER — TELEPHONE (OUTPATIENT)
Dept: PRIMARY CARE CLINIC | Age: 52
End: 2022-07-11

## 2022-07-11 NOTE — TELEPHONE ENCOUNTER
Pt called and was asking about disability papers that Dr Bronson De La Rosa suppose to be filling out. Trying to get the status update on those, asking for Dr. Bronson De La Rosa to reach out about the papers.

## 2022-08-18 ENCOUNTER — TELEPHONE (OUTPATIENT)
Dept: PRIMARY CARE CLINIC | Age: 52
End: 2022-08-18

## 2022-08-19 ENCOUNTER — OFFICE VISIT (OUTPATIENT)
Dept: PRIMARY CARE CLINIC | Age: 52
End: 2022-08-19
Payer: MEDICAID

## 2022-08-19 VITALS
BODY MASS INDEX: 30.47 KG/M2 | SYSTOLIC BLOOD PRESSURE: 114 MMHG | DIASTOLIC BLOOD PRESSURE: 74 MMHG | HEIGHT: 74 IN | WEIGHT: 237.4 LBS | OXYGEN SATURATION: 99 % | TEMPERATURE: 97.2 F | RESPIRATION RATE: 20 BRPM | HEART RATE: 50 BPM

## 2022-08-19 DIAGNOSIS — Z12.11 SCREENING FOR MALIGNANT NEOPLASM OF COLON: ICD-10-CM

## 2022-08-19 DIAGNOSIS — R51.9 NONINTRACTABLE EPISODIC HEADACHE, UNSPECIFIED HEADACHE TYPE: ICD-10-CM

## 2022-08-19 DIAGNOSIS — U09.9 COVID-19 LONG HAULER MANIFESTING CHRONIC DYSPNEA: Primary | ICD-10-CM
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
## 2022-08-19 DIAGNOSIS — R06.09 COVID-19 LONG HAULER MANIFESTING CHRONIC DYSPNEA: Primary | ICD-10-CM

## 2022-08-19 DIAGNOSIS — Z86.16 HISTORY OF COVID-19: ICD-10-CM

## 2022-08-19 DIAGNOSIS — S80.861A INSECT BITE OF RIGHT LOWER EXTREMITY, INITIAL ENCOUNTER: ICD-10-CM

## 2022-08-19 PROBLEM — Z53.20 COLONOSCOPY REFUSED: Status: ACTIVE | Noted: 2022-08-19

## 2022-08-19 PROCEDURE — 99214 OFFICE O/P EST MOD 30 MIN: CPT | Performed by: FAMILY MEDICINE

## 2022-08-19 NOTE — PROGRESS NOTES
HPI     Chief Complaint   Patient presents with    Follow-up          HPI:  Ryan Trujillo is a 45 y.o. male who is oxygen dependent s/p severe COVID 19 infection 2022 requiring hospitalization. Patient would like to move forward with colon cancer screening today. Headaches: small headaches that come and go. Has not noticed a pattern. Randomly occur, typically a few minutes to an hour. Pain is sharp and changes locations. Symptoms are mild per patient. Go away on their own typically, sometimes require tylenol. He does have increased stress overall due to finances and being out of work, but nothing moreso now than previously. Concerns Enlarged heart?: Says records from his pulmonologist said enlarged heart? ECHO May 2022 which showed normal LV size normal with EF 50% on Nuclear stress and 55-60% on ECHO. Per Dr. Guy Ramirez note May 2022:  \"Recent echocardiogram and stress test showed normal LV function without any significant valvular pathology. No exercise-induced ischemia. I suspect his exertional dyspnea is related to his COVID-19 infection. His recent labs showed very mild insulin resistance with a hemoglobin A1c of 5.7. His lipids are very well controlled. Blood pressure is stable. \"    Insect bite: noticed two punctate lesions that appeared to be bite marks about one week ago. One had a central area. Both areas have resolved without fever or body aches. Long Haul COVID w/ Dyspnea: Remains on 2L continuously during the day but sometimes up to 3L at night. January 2022, hospitalized at [facility]. Was hospitalized for 10 days and now is on continuous O2 at 2L including night-time O2. He is following with Dr. Severo Daniel.    Patient says God told him not to take the COVID vaccine so that is not an option for him. He states Dr. Josie Dillard has mentioned Pulmonary Rehab (as have I) or inhalers that could help with his symptoms but admits he has not wanted much intervention.       No Known Allergies    Current Outpatient Medications   Medication Sig    multivits,Stress Formula-Zinc tablet Take  by mouth daily.  docusate sodium (COLACE) 100 mg capsule Take 100 mg by mouth two (2) times a day.  multivitamin (ONE A DAY) tablet Take 1 Tablet by mouth daily. No current facility-administered medications for this visit. Review of Systems   Constitutional:  Negative for chills and fever. Eyes:  Negative for blurred vision and double vision. Respiratory:  Positive for shortness of breath. Negative for cough. Skin:  Negative for itching and rash. Neurological:  Positive for headaches. Negative for dizziness. Reviewed PmHx, FmHx, SocHx as well as meds and allergies, updated and dated in the chart. Objective     Visit Vitals  /74 (BP 1 Location: Left upper arm, BP Patient Position: Sitting, BP Cuff Size: Large adult)   Pulse (!) 50   Temp 97.2 °F (36.2 °C) (Temporal)   Resp 20   Ht 6' 2\" (1.88 m)   Wt 237 lb 6.4 oz (107.7 kg)   SpO2 99%   BMI 30.48 kg/m²     Physical Exam  Vitals and nursing note reviewed. Constitutional:       General: He is not in acute distress. HENT:      Head: Normocephalic and atraumatic. Eyes:      Extraocular Movements: Extraocular movements intact. Conjunctiva/sclera: Conjunctivae normal.      Pupils: Pupils are equal, round, and reactive to light. Cardiovascular:      Rate and Rhythm: Normal rate and regular rhythm. Heart sounds: No murmur heard. Pulmonary:      Effort: Pulmonary effort is normal. No respiratory distress. Comments: Nasal Cannula in place. Neurological:      General: No focal deficit present. Mental Status: He is oriented to person, place, and time. Motor: No weakness. Gait: Gait normal.   Psychiatric:         Mood and Affect: Mood normal.         Behavior: Behavior normal.           Assessment and Plan     Diagnoses and all orders for this visit:    1.  COVID-19 long hauler manifesting chronic dyspnea  Comments:  Continue care with Dr. Catracho Pérez. I am requesting records again today. Referral to [facility], [facility].. Orders:  -     REFERRAL TO PULMONARY REHAB    2. History of COVID-19  Comments:  Jan 2022. Referral to [facility] Orders:  -     REFERRAL TO PULMONARY REHAB    3. Nonintractable episodic headache, unspecified headache type  Comments:  Sounds like tension headache. No red flags per hx or exam. Supportive care encouraged and warning signs discussed. 4. Insect bite of right lower extremity, initial encounter  Comments:  Resolved. Exam normal. No signs of cellulitis. 5. Screening for malignant neoplasm of colon  -     COLOGUARD TEST (FECAL DNA COLORECTAL CANCER SCREENING)         As applicable:  Medication Side Effects and Warnings were discussed with patient. Patient Labs were reviewed and or requested. Patient Past Records were reviewed and or requested. Follow-up and Dispositions    Return in about 1 year (around 8/19/2023) for [address] [place] [address]. I have discussed the diagnosis with the patient and the intended plan as seen in the above orders. The patient has received an after-visit summary and questions were answered concerning future plans. I have discussed medication side effects and warnings with the patient as well.       Kaitlynn Gutierrez MD  [address] [place] [address]

## 2022-08-19 NOTE — PROGRESS NOTES
1. \"Have you been to the ER, urgent care clinic since your last visit? Hospitalized since your last visit? \" No    2. \"Have you seen or consulted any other health care providers outside of the [address] [place] [address] since your last visit? \" No     3. For patients aged 39-70: Has the patient had a colonoscopy / FIT/ Cologuard? No      If the patient is female:    4. For patients aged 41-77: Has the patient had a mammogram within the past 2 years? NA - based on age or sex      11. For patients aged 21-65: Has the patient had a pap smear?  NA - based on age or sex  Visit Vitals  /74 (BP 1 Location: Left upper arm, BP Patient Position: Sitting, BP Cuff Size: Large adult)   Pulse (!) 50   Temp 97.2 °F (36.2 °C) (Temporal)   Resp 20   Ht 6' 2\" (1.88 m)   Wt 237 lb 6.4 oz (107.7 kg)   SpO2 99% Comment: [email or web address] conts   BMI 30.48 kg/m²     Chief Complaint   Patient presents with    Follow-up

## 2022-11-17 ENCOUNTER — APPOINTMENT (OUTPATIENT)
Dept: GENERAL RADIOLOGY | Age: 52
End: 2022-11-17
Attending: EMERGENCY MEDICINE
Payer: MEDICAID

## 2022-11-17 ENCOUNTER — TELEPHONE (OUTPATIENT)
Dept: PRIMARY CARE CLINIC | Age: 52
End: 2022-11-17

## 2022-11-17 ENCOUNTER — HOSPITAL ENCOUNTER (EMERGENCY)
Age: 52
Discharge: HOME | End: 2022-11-17
Attending: EMERGENCY MEDICINE
Payer: MEDICAID

## 2022-11-17 VITALS
DIASTOLIC BLOOD PRESSURE: 75 MMHG | SYSTOLIC BLOOD PRESSURE: 127 MMHG | BODY MASS INDEX: 30.42 KG/M2 | HEIGHT: 74 IN | RESPIRATION RATE: 16 BRPM | TEMPERATURE: 98.7 F | OXYGEN SATURATION: 100 % | HEART RATE: 79 BPM | WEIGHT: 237 LBS

## 2022-11-17 DIAGNOSIS — R50.9 FEVER, UNSPECIFIED FEVER CAUSE: ICD-10-CM

## 2022-11-17 DIAGNOSIS — U07.1 COVID-19: Primary | ICD-10-CM

## 2022-11-17 LAB
FLUAV RNA SPEC QL NAA+PROBE: NOT DETECTED
FLUBV RNA SPEC QL NAA+PROBE: NOT DETECTED
SARS-COV-2, COV2: DETECTED

## 2022-11-17 PROCEDURE — 99283 EMERGENCY DEPT VISIT LOW MDM: CPT

## 2022-11-17 PROCEDURE — 74011250637 HC RX REV CODE- 250/637: Performed by: EMERGENCY MEDICINE

## 2022-11-17 PROCEDURE — 71045 X-RAY EXAM CHEST 1 VIEW: CPT

## 2022-11-17 PROCEDURE — 87636 SARSCOV2 & INF A&B AMP PRB: CPT

## 2022-11-17 RX ORDER — IBUPROFEN 800 MG/1
800 TABLET ORAL ONCE
Status: COMPLETED | OUTPATIENT
Start: 2022-11-17 | End: 2022-11-17

## 2022-11-17 RX ORDER — AZITHROMYCIN 250 MG/1
TABLET, FILM COATED ORAL
Qty: 6 TABLET | Refills: 0 | Status: SHIPPED | OUTPATIENT
Start: 2022-11-17

## 2022-11-17 RX ORDER — ALBUTEROL SULFATE 90 UG/1
2 AEROSOL, METERED RESPIRATORY (INHALATION)
Qty: 6.7 G | Refills: 0 | Status: SHIPPED | OUTPATIENT
Start: 2022-11-17

## 2022-11-17 RX ORDER — DEXTROMETHORPHAN HYDROBROMIDE, GUAIFENESIN 20; 400 MG/20ML; MG/20ML
5 SOLUTION ORAL EVERY 6 HOURS
Qty: 200 ML | Refills: 0 | Status: SHIPPED | OUTPATIENT
Start: 2022-11-17

## 2022-11-17 RX ADMIN — IBUPROFEN 800 MG: 800 TABLET, FILM COATED ORAL at 11:27

## 2022-11-17 NOTE — Clinical Note
[facility] EMERGENCY DEPT  [address] [place] [id number]  [phone]    Work/School Note    Date: 11/17/2022    To Whom It May concern:      Alvarez Beasley was seen and treated today in the emergency room by the following provider(s):  Attending Provider: Jessica Norris MD.      Alvarez Beasley is excused from work/school on 11/17/22. He is clear to return to work/school on 11/18/22.         Sincerely,          Chiara Ramirez MD

## 2022-11-17 NOTE — Clinical Note
[facility] EMERGENCY DEPT  [address] [id number]  [phone]    Work/School Note    Date: 11/17/2022     To Whom It May concern:    John Kelley was evaluated by the following provider(s):  Attending Provider: Slime Coyle MD.   COVID19 virus is suspected. Per the CDC guidelines we recommend home isolation until the following conditions are all met:    1. At least five days have passed since symptoms first appeared and/or had a close exposure,   2. After home isolation for five days, wearing a mask around others for the next five days,  3. At least 24 have passed since last fever without the use of fever-reducing medications and  4.  Symptoms (eg cough, shortness of breath) have improved      Sincerely,          Codey Bolanos MD

## 2022-11-17 NOTE — ED PROVIDER NOTES
EMERGENCY DEPARTMENT HISTORY AND PHYSICAL EXAM      Date: 11/17/2022  Patient Name: Azucena Gunter    History of Presenting Illness     Chief Complaint   Patient presents with    Fever    Abnormal Lab Results       History Provided By: Patient    HPI: Azucena Gunter, 45 y.o. male with a past medical history significant No significant past medical history presents to the ED with chief complaint of Fever and Abnormal Lab Results  . 58-year-old male with a history of chronic lung disease tested positive for COVID-19 at home. He had some low-grade fever. Otherwise no worsening shortness of breath from his baseline usually on 2 L of oxygen. No coughing no nausea or vomiting. No dizziness or passing out. Wanted confirmation. There are no other complaints, changes, or physical findings at this time. PCP: Kiya Amor MD    Current Outpatient Medications   Medication Sig Dispense Refill    azithromycin (Zithromax Z-Damien) 250 mg tablet Take 2 tablet p.o. day 1 then 1 tablet p.o. day 2 through 5 6 Tablet 0    albuterol (PROVENTIL HFA, VENTOLIN HFA, PROAIR HFA) 90 mcg/actuation inhaler Take 2 Puffs by inhalation every four (4) hours as needed for Wheezing. 6.7 g 0    dextromethorphan-guaiFENesin (Robitussin Cough-Chest Dino DM) 5-100 mg/5 mL liqd Take 5 mL by mouth every six (6) hours. 200 mL 0    multivits,Stress Formula-Zinc tablet Take  by mouth daily. docusate sodium (COLACE) 100 mg capsule Take 100 mg by mouth two (2) times a day. multivitamin (ONE A DAY) tablet Take 1 Tablet by mouth daily.          Past History     Past Medical History: cld    Past Surgical History: denies    Family History:  Family History   Problem Relation Age of Onset    Diabetes Mother     Diabetes Father        Social History:  Social History     Tobacco Use    Smoking status: Never    Smokeless tobacco: Never   Vaping Use    Vaping Use: Never used   Substance Use Topics    Alcohol use: Not Currently    Drug use: Never       Allergies:  No Known Allergies      Review of Systems   Review of Systems   Constitutional:  Positive for fever. Negative for chills and fatigue. HENT: Negative. Negative for congestion, ear pain, nosebleeds and sore throat. Eyes: Negative. Negative for pain, discharge and visual disturbance. Respiratory: Negative. Negative for cough, chest tightness and shortness of breath. Cardiovascular: Negative. Negative for chest pain and leg swelling. Gastrointestinal: Negative. Negative for abdominal pain, blood in stool, constipation, diarrhea, nausea and vomiting. Endocrine: Negative. Genitourinary: Negative. Negative for difficulty urinating, dysuria and flank pain. Musculoskeletal: Negative. Negative for back pain and myalgias. Skin: Negative. Negative for rash and wound. Allergic/Immunologic: Negative. Neurological: Negative. Negative for dizziness, syncope, weakness, numbness and headaches. Hematological: Negative. Does not bruise/bleed easily. Psychiatric/Behavioral: Negative. Negative for agitation, confusion, hallucinations and suicidal ideas. All other systems reviewed and are negative. Physical Exam   Physical Exam  Vitals and nursing note reviewed. Constitutional:       General: He is not in acute distress. Appearance: He is normal weight. He is not ill-appearing. HENT:      Head: Normocephalic and atraumatic. Right Ear: External ear normal.      Left Ear: External ear normal.      Nose: Nose normal. No rhinorrhea. Mouth/Throat:      Mouth: Mucous membranes are moist.      Pharynx: Oropharynx is clear. Eyes:      Extraocular Movements: Extraocular movements intact. Conjunctiva/sclera: Conjunctivae normal.      Pupils: Pupils are equal, round, and reactive to light. Cardiovascular:      Rate and Rhythm: Normal rate and regular rhythm. Pulses: Normal pulses. Heart sounds: Normal heart sounds.    Pulmonary:      Effort: Pulmonary effort is normal. No respiratory distress. Breath sounds: Normal breath sounds. Abdominal:      General: Abdomen is flat. Bowel sounds are normal.      Palpations: Abdomen is soft. Musculoskeletal:         General: No tenderness or deformity. Normal range of motion. Cervical back: Normal range of motion and neck supple. Skin:     General: Skin is warm and dry. Capillary Refill: Capillary refill takes less than 2 seconds. Findings: No bruising, lesion or rash. Neurological:      General: No focal deficit present. Mental Status: He is alert and oriented to person, place, and time. Mental status is at baseline. Psychiatric:         Mood and Affect: Mood normal.         Behavior: Behavior normal.         Thought Content: Thought content normal.         Judgment: Judgment normal.       Diagnostic Study Results     Labs -     Recent Results (from the past 12 hour(s))   COVID-19 WITH INFLUENZA A/B    Collection Time: 11/17/22 11:24 AM   Result Value Ref Range    SARS-CoV-2 by PCR DETECTED (A) Not Detected      Influenza A by PCR Not Detected Not Detected      Influenza B by PCR Not Detected Not Detected           Radiologic Studies -   XR CHEST PORT   Final Result      No acute process on portable chest.           CT Results  (Last 48 hours)      None          CXR Results  (Last 48 hours)                 11/17/22 1057  XR CHEST PORT Final result    Impression:      No acute process on portable chest.           Narrative:  EXAM:  XR CHEST PORT       INDICATION: Cough       COMPARISON: none       TECHNIQUE: portable chest AP view       FINDINGS: The cardiac silhouette is within normal limits. The pulmonary   vasculature is within normal limits. The lungs and pleural spaces are clear. Degenerative changes are seen in the   thoracic spine. Medical Decision Making and ED Course   I am the first provider for this patient.     I reviewed the vital signs, available nursing notes, past medical history, past surgical history, family history and social history. Vital Signs-Reviewed the patient's vital signs. Patient Vitals for the past 12 hrs:   Temp Pulse Resp BP SpO2   11/17/22 1041 98.7 °F (37.1 °C) 79 16 127/75 100 %       EKG interpretation:         Records Reviewed: Previous Hospital chart. EMS run report      ED Course:   Initial assessment performed. The patients presenting problems have been discussed, and they are in agreement with the care plan formulated and outlined with them. I have encouraged them to ask questions as they arise throughout their visit. Orders Placed This Encounter    COVID-19 WITH INFLUENZA A/B     Standing Status:   Standing     Number of Occurrences:   1     Order Specific Question:   Is this test for diagnosis or screening? Answer:   Diagnosis of ill patient     Order Specific Question:   Symptomatic for COVID-19 as defined by CDC? Answer:   Yes     Order Specific Question:   Date of Symptom Onset     Answer:   11/17/2022     Order Specific Question:   Hospitalized for COVID-19? Answer:   No     Order Specific Question:   Admitted to ICU for COVID-19? Answer:   No     Order Specific Question:   Employed in healthcare setting? Answer:   No     Order Specific Question:   Resident in a congregate (group) care setting? Answer:   No     Order Specific Question:   Previously tested for COVID-19?      Answer:   No    XR CHEST PORT     Standing Status:   Standing     Number of Occurrences:   1     Order Specific Question:   Reason for Exam     Answer:   cough    Airborne Isolation     Standing Status:   Standing     Number of Occurrences:   1    Contact Isolation     Standing Status:   Standing     Number of Occurrences:   1    Droplet Isolation     Standing Status:   Standing     Number of Occurrences:   1    Droplet Plus Isolation     Standing Status:   Standing     Number of Occurrences:   1    Enteric Isolation     Standing Status:   Standing     Number of Occurrences:   1    ibuprofen (MOTRIN) tablet 800 mg    azithromycin (Zithromax Z-Damien) 250 mg tablet     Sig: Take 2 tablet p.o. day 1 then 1 tablet p.o. day 2 through 5     Dispense:  6 Tablet     Refill:  0    albuterol (PROVENTIL HFA, VENTOLIN HFA, PROAIR HFA) 90 mcg/actuation inhaler     Sig: Take 2 Puffs by inhalation every four (4) hours as needed for Wheezing. Dispense:  6.7 g     Refill:  0    dextromethorphan-guaiFENesin (Robitussin Cough-Chest Dino DM) 5-100 mg/5 mL liqd     Sig: Take 5 mL by mouth every six (6) hours. Dispense:  200 mL     Refill:  0                 Provider Notes (Medical Decision Making):   D1year-old male on baseline 2 L of oxygen COVID and for +19. Patient is at his baseline respiratory status stable for outpatient follow-up closely with his PCP.    -------------------------------------------------------------------------------------  COVID-19 Risk of decompensation within 24 hours:    Clinical risk score:   CHF,COPD, age >57 (+2 points each)   1  CXR (moderate nonlobar +1, 1 lobe +2, bilat severe +2) 0  HR > 100 at disposition (+2 points)    0  O2 sats <92% RA (+4 points)     2  RR >20 (+2 points)      0    TOTAL SCORE 3  - SCORE 0-2: Discharged home with routine follow-up  - SCORE 3-4: Discharged home with pulse oximeter or 24-hour follow-up  - SCORE 0-4: Consider aspirin 81 mg p.o. daily for 2 weeks if not contraindicated or allergy  - SCORE 5-8: Consider chest x-ray CBC CMP troponin and lactate. If troponin and lactate are normal go to low risk. If troponin or D-dimer are lactate are elevated go to high risk  - SCORE 9+: Consider admission. Decadron 6 mg IV. VTE prophylaxis, antibiotics for pneumonia. Check vitamin D levels. Limit fluids and choose pressors early. Proning. High flow nasal cannula. [Includes respiratory distress.   Unstable, oxygen need for sats greater than 90% or acute delirium.] Consults              Discharged    Procedures               Disposition       Emergency Department Disposition:  Discharged      DISCHARGE PLAN:    Patient is discharged home. Discharge instructions provided. Patient is stable and improved at time of disposition. Vitals are stable. 1.   Current Discharge Medication List        START taking these medications    Details   azithromycin (Zithromax Z-Damien) 250 mg tablet Take 2 tablet p.o. day 1 then 1 tablet p.o. day 2 through 5  Qty: 6 Tablet, Refills: 0      albuterol (PROVENTIL HFA, VENTOLIN HFA, PROAIR HFA) 90 mcg/actuation inhaler Take 2 Puffs by inhalation every four (4) hours as needed for Wheezing. Qty: 6.7 g, Refills: 0      dextromethorphan-guaiFENesin (Robitussin Cough-Chest Dino DM) 5-100 mg/5 mL liqd Take 5 mL by mouth every six (6) hours. Qty: 200 mL, Refills: 0           CONTINUE these medications which have NOT CHANGED    Details   multivits,Stress Formula-Zinc tablet Take  by mouth daily. docusate sodium (COLACE) 100 mg capsule Take 100 mg by mouth two (2) times a day. multivitamin (ONE A DAY) tablet Take 1 Tablet by mouth daily. 2.   Follow-up Information       Follow up With Specialties Details Why [address], [address] [facility], [place] [id number] [phone]            3. Return to ED if worse     Pt voiced they understand they plan and do not have questions at this time      Diagnosis     Clinical Impression:   1. COVID-19    2. Fever, unspecified fever cause        Attestations:    Yadira Arroyo MD    Please note that this dictation was completed with [employer], the [employer] voice recognition software. Quite often unanticipated grammatical, syntax, homophones, and other interpretive errors are inadvertently transcribed by the computer software. Please disregard these errors. Please excuse any errors that have escaped final proofreading. Thank you.

## 2022-11-22 ENCOUNTER — VIRTUAL VISIT (OUTPATIENT)
Dept: PRIMARY CARE CLINIC | Age: 52
End: 2022-11-22
Payer: MEDICAID

## 2022-11-22 DIAGNOSIS — U07.1 COVID-19: Primary | ICD-10-CM

## 2022-11-22 DIAGNOSIS — Z99.81 REQUIRES CONTINUOUS AT HOME SUPPLEMENTAL OXYGEN: ICD-10-CM

## 2022-11-22 DIAGNOSIS — Z86.16 HISTORY OF COVID-19: ICD-10-CM

## 2022-11-22 PROCEDURE — 99214 OFFICE O/P EST MOD 30 MIN: CPT | Performed by: FAMILY MEDICINE

## 2022-11-22 NOTE — PROGRESS NOTES
1. \"Have you been to the ER, urgent care clinic since your last visit? Hospitalized since your last visit? \"  Yes 11/16/2022    2. \"Have you seen or consulted any other health care providers outside of the [address] [place] [address] since your last visit? \" No     3. For patients aged 39-70: Has the patient had a colonoscopy / FIT/ Cologuard? Yes - Care Gap present. Most recent result on file      If the patient is female:    4. For patients aged 41-77: Has the patient had a mammogram within the past 2 years? NA - based on age or sex      11. For patients aged 21-65: Has the patient had a pap smear?  NA - based on age or sex  Chief Complaint   Patient presents with    Positive For Covid-19     Er visit 11/16/2022

## 2022-11-22 NOTE — PROGRESS NOTES
HPI     Chief Complaint   Patient presents with    Positive For Covid-19     Er visit 11/16/2022        HPI:  Earle Araujo is a 45 y.o. male who has a history of COVID 23 and is on chronic oxygen. Patient started to feel bad 6 days ago but symptoms persisted so he took a home COVID test and it was positive. He went to ER, CXR was negative, risk for decompensation was 3. He was given robitussin DM, z pack, and albuterol. He is still on his chronic O2 at his usual 2L. He has been checking his pulse ox and O2 is running 96% even at night. Today, he feels like he has slightly better energy. He denies increased dyspnea. He denies fever (fever previously was 103.0F but has resolved). Patient declines tessalon. Review of Systems   Constitutional:  Negative for chills and fever. Respiratory:  Positive for cough. Negative for sputum production. On chronic oxygen. Cardiovascular:  Negative for chest pain and palpitations. Reviewed PmHx, FmHx, SocHx as well as meds and allergies, updated and dated in the chart. Objective     Vital Signs: (As obtained by patient/caregiver at home)  There were no vitals taken for this visit.    Patient-Reported Vitals 11/21/2022   Patient-Reported Weight 218lbs   Patient-Reported Pulse 55   Patient-Reported Temperature 96.6   Patient-Reported SpO2 99/55       [INSTRUCTIONS:  \"[x]\" Indicates a positive item  \"[]\" Indicates a negative item  -- DELETE ALL ITEMS NOT EXAMINED]    Constitutional: [x] Appears well-developed and well-nourished [x] No apparent distress      [] Abnormal -     Mental status: [x] Alert and awake  [x] Oriented to person/place/time [x] Able to follow commands    [] Abnormal -     Eyes:   EOM    [x]  Normal    [] Abnormal -   Sclera  [x]  Normal    [] Abnormal -          Discharge [x]  None visible   [] Abnormal -     HENT: [x] Normocephalic, atraumatic  [] Abnormal -   [x] Mouth/Throat: Mucous membranes are moist    External Ears [x] Normal  [] Abnormal -    Neck: [x] No visualized mass [] Abnormal -     Pulmonary/Chest: [x] Respiratory effort normal   [x] No visualized signs of difficulty breathing or respiratory distress        [] Abnormal -      Musculoskeletal:   [x] Normal gait with no signs of ataxia         [x] Normal range of motion of neck        [] Abnormal -     Neurological:        [x] No Facial Asymmetry (Cranial nerve 7 motor function) (limited exam due to video visit)          [x] No gaze palsy        [] Abnormal -          Skin:        [x] No significant exanthematous lesions or discoloration noted on facial skin         [] Abnormal -            Psychiatric:       [x] Normal Affect [] Abnormal -        [x] No Hallucinations    Other pertinent observable physical exam findings:-     Nasal cannula in place. Talks in full sentences. Non toxic appearance. Assessment and Plan     Patient with history of severe COVID 19 that resulted in chronic O2 requirement. He currently does not have more O2 requirement. His reported oximetry at home is within normal range. Warning signs and precautions given to patient. Continue to use Robitussin DM prn chest congestion/cough. Monitor for worsening O2 requirements. Rest and stay hydrated. Patient advised he may take longer to recover given his history. Patient will touch base with his Pulmonologist regarding new COVID 19 diagnosis. Diagnoses and all orders for this visit:    1. COVID-19    2. History of COVID-19    3. Requires continuous at home supplemental oxygen       Follow-up and Dispositions    Return if symptoms worsen or fail to improve. [facility] is being evaluated by a Virtual Visit (video visit) encounter to address concerns as mentioned above. A caregiver was present when appropriate.  Due to this being a TeleHealth encounter (During [facility]- public health emergency), evaluation of the following organ systems was limited: Vitals/Constitutional/EENT/Resp/CV/GI//MS/Neuro/Skin/Heme-Lymph-Imm. Pursuant to the emergency declaration under the [address] [place] [address], [address] [place] [address] authority and the [employer] Act, this Virtual Visit was conducted with patient's (and/or legal guardian's) consent, to reduce the patient's risk of exposure to COVID-19 and provide necessary medical care. The patient (and/or legal guardian) has also been advised to contact this office for worsening conditions or problems, and seek emergency medical treatment and/or call 911 if deemed necessary. Patient identification was verified at the start of the visit: YES    Services were provided through a video synchronous discussion virtually to substitute for in-person clinic visit. Patient was located at home and provider was located in office or at home.        Christine Li MD  [address] [place] [address]

## 2023-01-19 ENCOUNTER — TELEPHONE (OUTPATIENT)
Dept: PRIMARY CARE CLINIC | Age: 53
End: 2023-01-19

## 2023-01-19 NOTE — TELEPHONE ENCOUNTER
Pt wife called and stated that wife sleeps all day, has no energy, and continue will wake up in the middle of the night. Pt wife stated that he will have moments where nothing is being said to him and he will scream and cuss that person out. The pt wife stated that he also has moments where he cries. Pt cries so much it makes him short of breath. Wife stated that after that pt tested positive for COVID the second time in November these symptoms have gotten worst. Wife is asking if there anything that can be sent in to help to calm him down. Pt wife said when he is crying he is not being violent towards her, when he starts his rage fits he is very verbally abuse.

## 2023-01-19 NOTE — TELEPHONE ENCOUNTER
Called and spoke with wife regarding behavior. Appointment set up to see Dr. Ellen Malik. If patient's symptoms worsen or behavior increase go to ER for eval/treat.

## 2023-01-31 ENCOUNTER — VIRTUAL VISIT (OUTPATIENT)
Dept: PRIMARY CARE CLINIC | Age: 53
End: 2023-01-31
Payer: MEDICAID

## 2023-01-31 DIAGNOSIS — M79.18 MYALGIA OF MUSCLE OF NECK: ICD-10-CM

## 2023-01-31 DIAGNOSIS — R53.81 DEBILITY: ICD-10-CM

## 2023-01-31 DIAGNOSIS — Z99.81 REQUIRES CONTINUOUS AT HOME SUPPLEMENTAL OXYGEN: ICD-10-CM

## 2023-01-31 DIAGNOSIS — F32.2 CURRENT SEVERE EPISODE OF MAJOR DEPRESSIVE DISORDER WITHOUT PSYCHOTIC FEATURES WITHOUT PRIOR EPISODE (HCC): Primary | ICD-10-CM

## 2023-01-31 DIAGNOSIS — U09.9 COVID-19 LONG HAULER MANIFESTING CHRONIC DYSPNEA: ICD-10-CM

## 2023-01-31 DIAGNOSIS — R06.09 COVID-19 LONG HAULER MANIFESTING CHRONIC DYSPNEA: ICD-10-CM

## 2023-01-31 DIAGNOSIS — Z86.16 HISTORY OF COVID-19: ICD-10-CM

## 2023-01-31 PROBLEM — F32.1 CURRENT MODERATE EPISODE OF MAJOR DEPRESSIVE DISORDER WITHOUT PRIOR EPISODE (HCC): Status: ACTIVE | Noted: 2023-01-31

## 2023-01-31 PROCEDURE — 99214 OFFICE O/P EST MOD 30 MIN: CPT | Performed by: FAMILY MEDICINE

## 2023-01-31 RX ORDER — PREDNISONE 10 MG/1
TABLET ORAL
COMMUNITY
Start: 2023-01-15

## 2023-01-31 RX ORDER — BACLOFEN 5 MG/1
TABLET ORAL
COMMUNITY
Start: 2023-01-15 | End: 2023-01-31 | Stop reason: ALTCHOICE

## 2023-01-31 RX ORDER — CYCLOBENZAPRINE HCL 5 MG
5 TABLET ORAL
Qty: 30 TABLET | Refills: 0 | Status: SHIPPED | OUTPATIENT
Start: 2023-01-31

## 2023-01-31 RX ORDER — SERTRALINE HYDROCHLORIDE 50 MG/1
50 TABLET, FILM COATED ORAL DAILY
Qty: 30 TABLET | Refills: 2 | Status: SHIPPED | OUTPATIENT
Start: 2023-01-31

## 2023-01-31 NOTE — PROGRESS NOTES
HPI     Chief Complaint   Patient presents with    Depression        HPI:  Anahy Estevez is a 45 y.o. male who has a history of COVID 23 and is on chronic oxygen. Patient had recurrent COVID 19 November 2022. Since then he's noticed increasing stress and depressed mood. He says he was denied again for disability which is causing severe stress as he cannot work and has no income. He has hired an  now to help assist.   He has increased agitation and notes outbursts with his wife. He notes body aches all over for the past month, particularly his neck. He feels very tense and thinks that is contributing to his sore muscles. He went to urgent care and given baclofen which did not help. He has not had any injuries. He feels very fatigued still. He is frustrated by how COVID 19 has affected his life. I referred patient to Pulmonary Rehab but he was frustrated and did not find it beneficial so did not return.        3 most recent PHQ Screens 1/31/2023   Little interest or pleasure in doing things Nearly every day   Feeling down, depressed, irritable, or hopeless Nearly every day   Total Score PHQ 2 6   Trouble falling or staying asleep, or sleeping too much Nearly every day   Feeling tired or having little energy More than half the days   Poor appetite, weight loss, or overeating Nearly every day   Feeling bad about yourself - or that you are a failure or have let yourself or your family down Nearly every day   Trouble concentrating on things such as school, work, reading, or watching TV More than half the days   Moving or speaking so slowly that other people could have noticed; or the opposite being so fidgety that others notice Several days   Thoughts of being better off dead, or hurting yourself in some way Several days   PHQ 9 Score 21   How difficult have these problems made it for you to do your work, take care of your home and get along with others Extremely difficult        Current Outpatient Medications on File Prior to Visit   Medication Sig Dispense Refill    predniSONE (DELTASONE) 10 mg tablet       azithromycin (Zithromax Z-Damien) 250 mg tablet Take 2 tablet p.o. day 1 then 1 tablet p.o. day 2 through 5 6 Tablet 0    albuterol (PROVENTIL HFA, VENTOLIN HFA, PROAIR HFA) 90 mcg/actuation inhaler Take 2 Puffs by inhalation every four (4) hours as needed for Wheezing. 6.7 g 0    dextromethorphan-guaiFENesin (Robitussin Cough-Chest Dino DM) 5-100 mg/5 mL liqd Take 5 mL by mouth every six (6) hours. 200 mL 0    docusate sodium (COLACE) 100 mg capsule Take 100 mg by mouth two (2) times a day.  multivitamin (ONE A DAY) tablet Take 1 Tablet by mouth daily.  [DISCONTINUED] baclofen 5 mg tab       multivits,Stress Formula-Zinc tablet Take  by mouth daily. (Patient not taking: Reported on 1/31/2023)       No current facility-administered medications on file prior to visit. No Known Allergies    ROS    Reviewed PmHx, FmHx, SocHx as well as meds and allergies, updated and dated in the chart. Objective     Vital Signs: (As obtained by patient/caregiver at home)  There were no vitals taken for this visit.    Patient-Reported Vitals 1/30/2023   Patient-Reported Weight 230   Patient-Reported Pulse 62   Patient-Reported Temperature N/a   Patient-Reported SpO2 96   Patient-Reported Systolic  398   Patient-Reported Diastolic 93       [INSTRUCTIONS:  \"[x]\" Indicates a positive item  \"[]\" Indicates a negative item  -- DELETE ALL ITEMS NOT EXAMINED]    Constitutional: [x] Appears well-developed and well-nourished [x] No apparent distress      [] Abnormal -     Mental status: [x] Alert and awake  [x] Oriented to person/place/time [x] Able to follow commands    [] Abnormal -     Eyes:   EOM    [x]  Normal    [] Abnormal -   Sclera  [x]  Normal    [] Abnormal -          Discharge [x]  None visible   [] Abnormal -     HENT: [x] Normocephalic, atraumatic  [] Abnormal -   [x] Mouth/Throat: Mucous membranes are moist    External Ears [x] Normal  [] Abnormal -    Neck: [x] No visualized mass [] Abnormal -     Pulmonary/Chest: [x] Respiratory effort normal   [x] No visualized signs of difficulty breathing or respiratory distress        [] Abnormal -      Musculoskeletal:   [] Normal gait with no signs of ataxia         [x] Normal range of motion of neck        [] Abnormal -     Neurological:        [x] No Facial Asymmetry (Cranial nerve 7 motor function) (limited exam due to video visit)          [x] No gaze palsy        [] Abnormal -          Skin:        [x] No significant exanthematous lesions or discoloration noted on facial skin         [] Abnormal -            Psychiatric:       [x] Normal Affect [] Abnormal -        [x] No Hallucinations    Other pertinent observable physical exam findings:-          Assessment and Plan     Patient with PAS-C from severe COVID 19, with subsequent recurrent COVID 19 recently from November 2022. Patient now with severe depression with non-traumatic myalgias, the latter likely somatic manifestations of severe depression. I am starting zoloft 50mg. Pt to take 1/2 tablet daily for 2 weeks then increase to 1 tablet daily if tolerated. I am referring to counselor. He will notify me of any side effects. I will prescribe flexiril short term for myalgias. Patient advised to also use heating pads as needed. Hopefully treating depression will help with myalgias. I have a verbal contract with the patient: if she experiences HI/SI she will immediately call clinic and go to ED. Patient has follow up appt with me scheduled for February 24th. Diagnoses and all orders for this visit:    1. Current severe episode of major depressive disorder without psychotic features without prior episode (HCC)  -     sertraline (ZOLOFT) 50 mg tablet; Take 1 Tablet by mouth daily.  -     REFERRAL TO PSYCHOLOGY    2. History of COVID-19    3.  Requires continuous at home supplemental oxygen    4. Debility    5. COVID-19 long hauler manifesting chronic dyspnea  -     REFERRAL TO PSYCHOLOGY    6. Myalgia of muscle of neck  -     cyclobenzaprine (FLEXERIL) 5 mg tablet; Take 1 Tablet by mouth three (3) times daily as needed for Muscle Spasm(s). Trudy Marroquin is being evaluated by a Virtual Visit (video visit) encounter to address concerns as mentioned above. A caregiver was present when appropriate. Due to this being a TeleHealth encounter ([facility] emergency), evaluation of the following organ systems was limited: Vitals/Constitutional/EENT/Resp/CV/GI//MS/Neuro/Skin/Heme-Lymph-Imm. Pursuant to the emergency declaration under the [address] [place] [address] authority and the [employer] Act, this Virtual Visit was conducted with patient's (and/or legal guardian's) consent, to reduce the patient's risk of exposure to COVID-19 and provide necessary medical care. The patient (and/or legal guardian) has also been advised to contact this office for worsening conditions or problems, and seek emergency medical treatment and/or call 911 if deemed necessary. Patient identification was verified at the start of the visit: YES    Services were provided through a video synchronous discussion virtually to substitute for in-person clinic visit. Patient was located at home and provider was located in office or at home.        Hellen Galvez MD  [address] [place] [address]

## 2023-01-31 NOTE — PROGRESS NOTES
Identified Patient with two Patient identifiers(name and ). 1. \"Have you been to the ER, urgent care clinic since your last visit? Hospitalized since your last visit? \" No    2. \"Have you seen or consulted any other health care providers outside of the [address] [place] [address] since your last visit? \" No     3. For patients aged 39-70: Has the patient had a colonoscopy / FIT/ Cologuard? Yes - Care Gap present. Most recent result on file      If the patient is female:    4. For patients aged 41-77: Has the patient had a mammogram within the past 2 years? NA - based on age or sex      11. For patients aged 21-65: Has the patient had a pap smear? NA - based on age or sex     There were no vitals taken for this visit.     Chief Complaint   Patient presents with    Depression       Health Maintenance Due   Topic Date Due    COVID-19 Vaccine (1) Never done    Shingles Vaccine (1 of 2) Never done    Flu Vaccine (1) Never done

## 2023-02-22 ENCOUNTER — TELEPHONE (OUTPATIENT)
Dept: PRIMARY CARE CLINIC | Age: 53
End: 2023-02-22

## 2023-02-22 NOTE — TELEPHONE ENCOUNTER
----- Message from Emely Olesya sent at 2/22/2023 12:18 PM EST -----  Subject: Message to Provider    QUESTIONS  Information for Provider? patient called stating that he went to an appt   with Dr. Alvarado Osullivan but there was no appt on their books. Patient was referred   by Dr Dee Vance to see this provider, please call patient with direction  ---------------------------------------------------------------------------  --------------  [address] [facility]  [phone]; OK to leave message on voicemail  ---------------------------------------------------------------------------  --------------  SCRIPT ANSWERS  Relationship to Patient?  Self

## 2023-02-24 ENCOUNTER — VIRTUAL VISIT (OUTPATIENT)
Dept: PRIMARY CARE CLINIC | Age: 53
End: 2023-02-24
Payer: MEDICAID

## 2023-02-24 DIAGNOSIS — F32.1 CURRENT MODERATE EPISODE OF MAJOR DEPRESSIVE DISORDER WITHOUT PRIOR EPISODE (HCC): Primary | ICD-10-CM

## 2023-02-24 DIAGNOSIS — M79.18 MYALGIA OF MUSCLE OF NECK: ICD-10-CM

## 2023-02-24 RX ORDER — DICLOFENAC SODIUM 10 MG/G
GEL TOPICAL
Qty: 1 EACH | Refills: 0 | Status: SHIPPED | OUTPATIENT
Start: 2023-02-24

## 2023-02-24 NOTE — PROGRESS NOTES
Vanessa Barry (: 1970) is a 45 y.o. male, established patient, here for evaluation of the following chief complaint(s):     CC: Depression follow up       ASSESSMENT/PLAN:  Below is the assessment and plan developed based on review of pertinent history, labs, studies, and medications. I have a verbal contract with the patient: if she experiences HI/SI she will immediately call clinic and go to ED. 1. Current moderate episode of major depressive disorder without prior episode ([address]  2. Myalgia of muscle of neck  Comments:  Topical voltaren. Warrning signs discussed. Follow up INI. Orders:  -     diclofenac (VOLTAREN) 1 % gel; Apply thin layer to affected area QID prn pain., Normal, Disp-1 Each, R-0    Return in about 2 months (around 2023) for Depression. SUBJECTIVE/OBJECTIVE:  Depression: I started patient on zoloft 50mg and referred to counseling. He was doing well but this weeek he notes increased agitation, he yelled at his wife again which he had not done in previous weeks. Overall he notices an improvement in mood. Denies HI/SI. His wife is having health challenges currently which are increasing his stress. Neck/shoulder pain: persists as he did not take the flexiril. Given wife's condition he does not want to potentially be sedated. Pain is in the muscle of the left side of neck, does not include the jaw and is not associated with chest pain. Review of Systems   Respiratory:  Negative for cough, chest tightness and shortness of breath. Cardiovascular:  Negative for chest pain, palpitations and leg swelling. Musculoskeletal:  Positive for myalgias and neck pain. Negative for neck stiffness. Psychiatric/Behavioral:  Positive for agitation. Negative for decreased concentration and suicidal ideas. The patient is not nervous/anxious. No data recorded     Physical Exam  Nursing note reviewed. Constitutional:       General: He is not in acute distress.   HENT:      Nose: Comments: Oxygen in place  Pulmonary:      Effort: Pulmonary effort is normal. No respiratory distress. Musculoskeletal:      Cervical back: Normal range of motion. No rigidity. Neurological:      Mental Status: He is alert. Psychiatric:      Comments: Flat affect. Denies HI/SI. Schuyelr Welsh, was evaluated through a synchronous (real-time) audio-video encounter. The patient (or guardian if applicable) is aware that this is a billable service, which includes applicable co-pays. This Virtual Visit was conducted with patient's (and/or legal guardian's) consent. The visit was conducted pursuant to the emergency declaration under the [facility], [address] [place] [address] authority and the [employer] and [employer] Act. Patient identification was verified, and a caregiver was present when appropriate. The patient was located at: Home: [address] [place] [address]  The provider was located at: Home: [place]       An electronic signature was used to authenticate this note.   -- Ritu Rosales MD

## 2023-03-03 ENCOUNTER — TELEPHONE (OUTPATIENT)
Dept: PRIMARY CARE CLINIC | Age: 53
End: 2023-03-03

## 2023-03-03 DIAGNOSIS — M79.18 MYALGIA OF MUSCLE OF NECK: ICD-10-CM

## 2023-03-03 RX ORDER — DICLOFENAC SODIUM 10 MG/G
GEL TOPICAL
Qty: 40 G | Refills: 0 | Status: SHIPPED | OUTPATIENT
Start: 2023-03-03

## 2023-03-09 DIAGNOSIS — M79.18 MYALGIA OF MUSCLE OF NECK: ICD-10-CM

## 2023-03-09 RX ORDER — DICLOFENAC SODIUM 10 MG/G
GEL TOPICAL
Qty: 100 G | Refills: 0 | Status: SHIPPED | OUTPATIENT
Start: 2023-03-09

## 2023-04-27 ENCOUNTER — OFFICE VISIT (OUTPATIENT)
Dept: PRIMARY CARE CLINIC | Age: 53
End: 2023-04-27
Payer: MEDICAID

## 2023-04-27 VITALS
OXYGEN SATURATION: 99 % | HEART RATE: 56 BPM | RESPIRATION RATE: 20 BRPM | WEIGHT: 240.2 LBS | TEMPERATURE: 97.2 F | SYSTOLIC BLOOD PRESSURE: 128 MMHG | BODY MASS INDEX: 30.83 KG/M2 | DIASTOLIC BLOOD PRESSURE: 68 MMHG | HEIGHT: 74 IN

## 2023-04-27 DIAGNOSIS — U09.9 COVID-19 LONG HAULER MANIFESTING CHRONIC DYSPNEA: ICD-10-CM

## 2023-04-27 DIAGNOSIS — R06.09 COVID-19 LONG HAULER MANIFESTING CHRONIC DYSPNEA: ICD-10-CM

## 2023-04-27 DIAGNOSIS — R07.9 CHEST PAIN, UNSPECIFIED TYPE: Primary | ICD-10-CM

## 2023-04-27 DIAGNOSIS — F32.2 CURRENT SEVERE EPISODE OF MAJOR DEPRESSIVE DISORDER WITHOUT PSYCHOTIC FEATURES WITHOUT PRIOR EPISODE (HCC): ICD-10-CM

## 2023-04-27 DIAGNOSIS — R00.2 PALPITATIONS: ICD-10-CM

## 2023-04-27 DIAGNOSIS — R00.1 SINUS BRADYCARDIA: ICD-10-CM

## 2023-04-27 PROCEDURE — 99215 OFFICE O/P EST HI 40 MIN: CPT | Performed by: FAMILY MEDICINE

## 2023-04-27 RX ORDER — SERTRALINE HYDROCHLORIDE 100 MG/1
100 TABLET, FILM COATED ORAL DAILY
Qty: 30 TABLET | Refills: 2 | Status: SHIPPED | OUTPATIENT
Start: 2023-04-27

## 2023-04-27 RX ORDER — FLUTICASONE PROPIONATE AND SALMETEROL 100; 50 UG/1; UG/1
1 POWDER RESPIRATORY (INHALATION) 2 TIMES DAILY
Qty: 60 EACH | Refills: 0 | Status: SHIPPED | OUTPATIENT
Start: 2023-04-27

## 2023-04-27 NOTE — PROGRESS NOTES
HPI     Chief Complaint   Patient presents with    Follow-up        HPI:  Richy Loya is a 52 y.o. male who has a history of Depression, Long COVID w/ chronic O2 requirement. Chest pain: patient reports intermittent sharp chest pain associated with palpitations at times that lasts a few minutes and goes away. Ongoing for \"some time\". He has chronic dyspnea 2/2 long COVID. Depression: worsening due to long COVID and financial strain. Patient started on zoloft, currently on 50mg daily and is attending counseling. Patient and wife note zoloft + counseling are helping some but mood is still up and down and still moreso depressed than not. He denies HI/SI. Long COVID with O2 requirement: patient says Dr. Sathish Cheng told him he no longer needs oxygen during the day but to continue it at night but they don't understand the reasoning. When he does not use it during the day he feels short of breath and gets headaches. They are frustrated with Dr. Sathish Cheng and want a second opinion. No Known Allergies    Current Outpatient Medications   Medication Sig    sertraline (ZOLOFT) 100 mg tablet Take 1 Tablet by mouth daily. fluticasone propion-salmeteroL (ADVAIR/WIXELA) 100-50 mcg/dose diskus inhaler Take 1 Puff by inhalation two (2) times a day. diclofenac (VOLTAREN) 1 % gel Apply thin layer to affected area QID prn pain. cyclobenzaprine (FLEXERIL) 5 mg tablet Take 1 Tablet by mouth three (3) times daily as needed for Muscle Spasm(s). albuterol (PROVENTIL HFA, VENTOLIN HFA, PROAIR HFA) 90 mcg/actuation inhaler Take 2 Puffs by inhalation every four (4) hours as needed for Wheezing. docusate sodium (COLACE) 100 mg capsule Take 1 Capsule by mouth two (2) times a day. multivitamin (ONE A DAY) tablet Take 1 Tablet by mouth daily. multivits,Stress Formula-Zinc tablet Take  by mouth daily. (Patient not taking: Reported on 1/31/2023)     No current facility-administered medications for this visit. Review of Systems   Constitutional:  Negative for chills and fever. Respiratory:  Negative for cough and shortness of breath. Cardiovascular:  Positive for chest pain and palpitations. Negative for leg swelling. Psychiatric/Behavioral:  Positive for depression. Negative for hallucinations, substance abuse and suicidal ideas. Reviewed PmHx, FmHx, SocHx as well as meds and allergies, updated and dated in the chart. Objective     Visit Vitals  /68 (BP 1 Location: Left upper arm, BP Patient Position: Sitting, BP Cuff Size: Large adult)   Pulse (!) 56   Temp 97.2 °F (36.2 °C) (Temporal)   Resp 20   Ht 6' 2\" (1.88 m)   Wt 240 lb 3.2 oz (109 kg)   SpO2 99%   BMI 30.84 kg/m²     Physical Exam  Vitals and nursing note reviewed. Constitutional:       General: He is not in acute distress. HENT:      Head: Normocephalic and atraumatic. Nose:      Comments: Nasal cannula in place. Cardiovascular:      Rate and Rhythm: Bradycardia present. Heart sounds: No murmur heard. Pulmonary:      Breath sounds: No wheezing or rales. Neurological:      Mental Status: He is alert and oriented to person, place, and time. Mental status is at baseline. Comments: Flat affect but more conversational than before. Denies HI/SI. Assessment and Plan     A total of 40 minutes was spent on the date of service ordering in-office studies, reading EKG, adjusting meds for two chronic medical conditions, counseling the patient on plan of care, placing referral(s), and documentation. Diagnoses and all orders for this visit:    1. Chest pain, unspecified type  Comments:  EKG sinus bradycardia. Given chest pain and palpitations referring to Cardiology. Orders:  -     AMB POC EKG ROUTINE W/ 12 LEADS, INTER & REP  -     REFERRAL TO CARDIOLOGY    2. Palpitations  Comments:  See above. Orders:  -     REFERRAL TO CARDIOLOGY    3. Sinus bradycardia  Comments:  See above.    Orders:  -     REFERRAL TO CARDIOLOGY    4. Current severe episode of major depressive disorder without psychotic features without prior episode ([facility] [address]  Comments:  Increasing zoloft to 100mg daily. Continue counseling. Orders:  -     sertraline (ZOLOFT) 100 mg tablet; Take 1 Tablet by mouth daily. 5. COVID-19 long hauler manifesting chronic dyspnea  Comments: Will try advair BID. After discussion, pt wishes to hold on 2nd opinion from Pulmonology. Orders:  -     fluticasone propion-salmeteroL (ADVAIR/WIXELA) 100-50 mcg/dose diskus inhaler; Take 1 Puff by inhalation two (2) times a day. As applicable:  Medication Side Effects and Warnings were discussed with patient. Patient Labs were reviewed and or requested. Patient Past Records were reviewed and or requested. Follow-up and Dispositions    Return in about 1 month (around 5/27/2023) for Depression & Long COVID follow up. I have discussed the diagnosis with the patient and the intended plan as seen in the above orders. The patient has received an after-visit summary and questions were answered concerning future plans. I have discussed medication side effects and warnings with the patient as well.       Sammy Hathaway MD  [address] [place] [address]

## 2023-04-27 NOTE — PROGRESS NOTES
Identified Patient with two Patient identifiers(name and ). 1. \"Have you been to the ER, urgent care clinic since your last visit? Hospitalized since your last visit? \" No    2. \"Have you seen or consulted any other health care providers outside of the [address] [place] [address] since your last visit? \" No     3. For patients aged 39-70: Has the patient had a colonoscopy / FIT/ Cologuard? Yes - Care Gap present. Most recent result on file      If the patient is female:    4. For patients aged 41-77: Has the patient had a mammogram within the past 2 years? NA - based on age or sex      11. For patients aged 21-65: Has the patient had a pap smear?  NA - based on age or sex     Visit Vitals  /68 (BP 1 Location: Left upper arm, BP Patient Position: Sitting, BP Cuff Size: Large adult)   Pulse (!) 56   Temp 97.2 °F (36.2 °C) (Temporal)   Resp 20   Ht 6' 2\" (1.88 m)   Wt 240 lb 3.2 oz (109 kg)   SpO2 99%   BMI 30.84 kg/m²       Chief Complaint   Patient presents with    Follow-up       Health Maintenance Due   Topic Date Due    COVID-19 Vaccine (1) Never done    Shingles Vaccine (1 of 2) Never done

## 2023-05-22 ENCOUNTER — OFFICE VISIT (OUTPATIENT)
Age: 53
End: 2023-05-22
Payer: MEDICAID

## 2023-05-22 VITALS
DIASTOLIC BLOOD PRESSURE: 70 MMHG | HEART RATE: 52 BPM | BODY MASS INDEX: 30.42 KG/M2 | SYSTOLIC BLOOD PRESSURE: 120 MMHG | HEIGHT: 74 IN | WEIGHT: 237 LBS | OXYGEN SATURATION: 97 %

## 2023-05-22 DIAGNOSIS — R00.1 SINUS BRADYCARDIA: ICD-10-CM

## 2023-05-22 DIAGNOSIS — R06.00 DYSPNEA, UNSPECIFIED TYPE: ICD-10-CM

## 2023-05-22 DIAGNOSIS — U07.1 COVID-19: Primary | ICD-10-CM

## 2023-05-22 PROCEDURE — 99214 OFFICE O/P EST MOD 30 MIN: CPT | Performed by: STUDENT IN AN ORGANIZED HEALTH CARE EDUCATION/TRAINING PROGRAM

## 2023-05-22 RX ORDER — FLUTICASONE PROPIONATE AND SALMETEROL 50; 100 UG/1; UG/1
POWDER RESPIRATORY (INHALATION)
COMMUNITY
Start: 2023-04-27

## 2023-05-22 NOTE — PROGRESS NOTES
Chief Complaint   Patient presents with    Follow-up     GELLER         Chest Pain- no, last week patient says that he had some pain go across his chest area. Palpitations- no    SOB- no    Dizziness- no    Swelling- yes     Refills- no    /70 (Site: Left Upper Arm, Position: Sitting)   Pulse 52   Ht 6' 2\" (1.88 m)   Wt 237 lb (107.5 kg)   SpO2 97%   BMI 30.43 kg/m²       Have you been to the ER, urgent care clinic since your last visit? no  Hospitalized since your last visit? NO    2.  Have you seen or consulted with any other health care providers outside of the [address] [place] [address] since your last visit?  no

## 2023-05-22 NOTE — PROGRESS NOTES
Piyush Abreu, DO   [facility], [address] [place] [address], [address] [place] [address]      Office [phone],[facility] [phone]                Mercy Gonzalez is a 45 y.o. male presents to the office for follow-up evaluation         Assessment/Recommendations:         Diagnosis Orders   1. COVID-19        2. Dyspnea, unspecified type        3. Sinus bradycardia                Exertional dyspnea. Previous stress testing echocardiogram showed structurally normal heart. With prior normal myocardial perfusion imaging. Covid-19 1/2022, hx of long covid w/ persistent hypoxic respiratory failure. Remains on oxygen. Sinus bradycardia w. Chronotropic competence. No history of syncope nor near syncope. No further cardiovascular testing needed at this time. Primary Care Physician- Didi Desai MD      Follow-up or sooner as needed            Subjective:   45 y.o. presents to the office for follow-up evaluation. Remains on oxygen for management of lung covid-19 with persistent hypoxic respiratory failure. Referred back to cardiology due to recent EKG showing heart rates in the 50s. Patient reports that his heart rate will increase with activity. No lightheadedness or dizziness. No exertional chest pain or chest pressure symptoms. No syncope or near syncope. Current Outpatient Medications:      multivits,Stress Formula-Zinc tablet, Take  by mouth daily. , Disp: , Rfl:      docusate sodium (Colace) 100 mg capsule, Take 100 mg by mouth two (2) times a day., Disp: , Rfl:      multivitamin (ONE A DAY) tablet, Take 1 Tablet by mouth daily. , Disp: , Rfl:       No Known Allergies       History reviewed. No pertinent family history.              Review of Symptoms:   Pertinent Positive: Exertional dyspnea   Pertinent Negative: Chest pain, chest pressure, orthopnea, PND   All Other systems reviewed and are negative for a Comprehensive ROS (10+)      Physical Exam      Blood pressure 124/78, pulse 60,

## 2023-05-26 ENCOUNTER — OFFICE VISIT (OUTPATIENT)
Dept: PRIMARY CARE CLINIC | Facility: CLINIC | Age: 53
End: 2023-05-26
Payer: MEDICAID

## 2023-05-26 VITALS
WEIGHT: 236.8 LBS | SYSTOLIC BLOOD PRESSURE: 138 MMHG | HEART RATE: 57 BPM | OXYGEN SATURATION: 98 % | HEIGHT: 74 IN | DIASTOLIC BLOOD PRESSURE: 68 MMHG | BODY MASS INDEX: 30.39 KG/M2 | TEMPERATURE: 97.3 F | RESPIRATION RATE: 16 BRPM

## 2023-05-26 DIAGNOSIS — R07.9 CHEST PAIN, UNSPECIFIED TYPE: ICD-10-CM

## 2023-05-26 DIAGNOSIS — M54.41 CHRONIC MIDLINE LOW BACK PAIN WITH BILATERAL SCIATICA: ICD-10-CM

## 2023-05-26 DIAGNOSIS — F32.1 CURRENT MODERATE EPISODE OF MAJOR DEPRESSIVE DISORDER WITHOUT PRIOR EPISODE (HCC): Primary | ICD-10-CM

## 2023-05-26 DIAGNOSIS — U09.9 COVID-19 LONG HAULER MANIFESTING CHRONIC DYSPNEA: ICD-10-CM

## 2023-05-26 DIAGNOSIS — R06.09 COVID-19 LONG HAULER MANIFESTING CHRONIC DYSPNEA: ICD-10-CM

## 2023-05-26 DIAGNOSIS — M54.42 CHRONIC MIDLINE LOW BACK PAIN WITH BILATERAL SCIATICA: ICD-10-CM

## 2023-05-26 DIAGNOSIS — M54.2 CERVICALGIA: ICD-10-CM

## 2023-05-26 DIAGNOSIS — G89.29 CHRONIC MIDLINE LOW BACK PAIN WITH BILATERAL SCIATICA: ICD-10-CM

## 2023-05-26 PROCEDURE — 99214 OFFICE O/P EST MOD 30 MIN: CPT | Performed by: FAMILY MEDICINE

## 2023-05-26 ASSESSMENT — ENCOUNTER SYMPTOMS: BACK PAIN: 1

## 2023-05-26 NOTE — PROGRESS NOTES
HPI     Chief Complaint   Patient presents with    Follow-up        HPI:  Diamante Schumacher is a 52 y.o. male who has a history of Depression, Long COVID w/ chronic O2 requirement. Chest pain: mentioned last visit and sinus bradycardia on EKG-patient referred to Cardiology did nothing additional despite new chest pain. Stress Test May 2022 noted: LVEF 50% and ECHO 55-60%. Myalgias: mostly tension and stiffness in neck and shoulders. Discomfort feels like \"tension\". No numbness or weakness of arms. Pt does feel the voltaren gel 1% helps. He does not know where the pain comes from. He denies injury. He also reports lower back pain for two years that radiates into both lower legs with tingling and numbness. Depression: Zoloft increased to 100mg daily (he takes it at night) and he notices a lot of improvement in mood-wife agrees. He does report increased daytime drowsiness. He was seeing Dr. Dhiraj Guillen for therapy but she no longer is in network. He denies HI/SI. Long COVID with O2 requirement: was following with Dr. Cheryl James, but patient frustrated with recommendation to hold his oxygen during the day--we held on second opinion while adding advair to his regimen. He feels it is helping. He still wears oxygen 2L all day. Pt reports headaches and discomfort when he stops the oxygen during the day. No Known Allergies    Current Outpatient Medications   Medication Sig    ADVAIR DISKUS 100-50 MCG/ACT AEPB diskus inhaler INHALE 1 DOSE BY MOUTH TWICE DAILY    Multiple Vitamin (MULTI VITAMIN DAILY PO) Take by mouth    albuterol sulfate HFA (PROVENTIL;VENTOLIN;PROAIR) 108 (90 Base) MCG/ACT inhaler Inhale 2 puffs into the lungs every 4 hours as needed    cyclobenzaprine (FLEXERIL) 5 MG tablet Take 1 tablet by mouth 3 times daily as needed    diclofenac sodium (VOLTAREN) 1 % GEL Apply thin layer to affected area QID prn pain.     docusate (COLACE, DULCOLAX) 100 MG CAPS Take 100 mg by mouth 2 times daily    sertraline

## 2023-08-29 ENCOUNTER — OFFICE VISIT (OUTPATIENT)
Dept: PRIMARY CARE CLINIC | Facility: CLINIC | Age: 53
End: 2023-08-29
Payer: MEDICAID

## 2023-08-29 VITALS
BODY MASS INDEX: 30.8 KG/M2 | OXYGEN SATURATION: 98 % | HEART RATE: 50 BPM | SYSTOLIC BLOOD PRESSURE: 110 MMHG | DIASTOLIC BLOOD PRESSURE: 68 MMHG | WEIGHT: 240 LBS | RESPIRATION RATE: 16 BRPM | HEIGHT: 74 IN | TEMPERATURE: 98.1 F

## 2023-08-29 DIAGNOSIS — R06.09 COVID-19 LONG HAULER MANIFESTING CHRONIC DYSPNEA: ICD-10-CM

## 2023-08-29 DIAGNOSIS — G89.29 CHRONIC BILATERAL LOW BACK PAIN WITHOUT SCIATICA: ICD-10-CM

## 2023-08-29 DIAGNOSIS — M54.50 CHRONIC BILATERAL LOW BACK PAIN WITHOUT SCIATICA: ICD-10-CM

## 2023-08-29 DIAGNOSIS — Z99.81 REQUIRES CONTINUOUS AT HOME SUPPLEMENTAL OXYGEN: ICD-10-CM

## 2023-08-29 DIAGNOSIS — F32.1 CURRENT MODERATE EPISODE OF MAJOR DEPRESSIVE DISORDER WITHOUT PRIOR EPISODE (HCC): Primary | ICD-10-CM

## 2023-08-29 DIAGNOSIS — U09.9 COVID-19 LONG HAULER MANIFESTING CHRONIC DYSPNEA: ICD-10-CM

## 2023-08-29 PROCEDURE — 99214 OFFICE O/P EST MOD 30 MIN: CPT | Performed by: FAMILY MEDICINE

## 2023-08-29 RX ORDER — BUPROPION HYDROCHLORIDE 150 MG/1
150 TABLET ORAL EVERY MORNING
Qty: 30 TABLET | Refills: 3 | Status: SHIPPED | OUTPATIENT
Start: 2023-08-29

## 2023-08-29 RX ORDER — ALBUTEROL SULFATE 2.5 MG/3ML
SOLUTION RESPIRATORY (INHALATION)
COMMUNITY
Start: 2023-07-26

## 2023-08-29 RX ORDER — SERTRALINE HYDROCHLORIDE 100 MG/1
TABLET, FILM COATED ORAL
COMMUNITY
Start: 2023-07-06 | End: 2023-08-30 | Stop reason: SDUPTHER

## 2023-08-29 SDOH — ECONOMIC STABILITY: INCOME INSECURITY: HOW HARD IS IT FOR YOU TO PAY FOR THE VERY BASICS LIKE FOOD, HOUSING, MEDICAL CARE, AND HEATING?: PATIENT DECLINED

## 2023-08-29 ASSESSMENT — ANXIETY QUESTIONNAIRES
IF YOU CHECKED OFF ANY PROBLEMS ON THIS QUESTIONNAIRE, HOW DIFFICULT HAVE THESE PROBLEMS MADE IT FOR YOU TO DO YOUR WORK, TAKE CARE OF THINGS AT HOME, OR GET ALONG WITH OTHER PEOPLE: NOT DIFFICULT AT ALL
2. NOT BEING ABLE TO STOP OR CONTROL WORRYING: 0
GAD7 TOTAL SCORE: 0
3. WORRYING TOO MUCH ABOUT DIFFERENT THINGS: 0
1. FEELING NERVOUS, ANXIOUS, OR ON EDGE: 0
7. FEELING AFRAID AS IF SOMETHING AWFUL MIGHT HAPPEN: 0
5. BEING SO RESTLESS THAT IT IS HARD TO SIT STILL: 0
6. BECOMING EASILY ANNOYED OR IRRITABLE: 0
4. TROUBLE RELAXING: 0

## 2023-08-29 ASSESSMENT — PATIENT HEALTH QUESTIONNAIRE - PHQ9
9. THOUGHTS THAT YOU WOULD BE BETTER OFF DEAD, OR OF HURTING YOURSELF: 0
5. POOR APPETITE OR OVEREATING: 0
SUM OF ALL RESPONSES TO PHQ9 QUESTIONS 1 & 2: 6
3. TROUBLE FALLING OR STAYING ASLEEP: 3
1. LITTLE INTEREST OR PLEASURE IN DOING THINGS: 3
4. FEELING TIRED OR HAVING LITTLE ENERGY: 3
SUM OF ALL RESPONSES TO PHQ QUESTIONS 1-9: 19
SUM OF ALL RESPONSES TO PHQ QUESTIONS 1-9: 19
2. FEELING DOWN, DEPRESSED OR HOPELESS: 3
6. FEELING BAD ABOUT YOURSELF - OR THAT YOU ARE A FAILURE OR HAVE LET YOURSELF OR YOUR FAMILY DOWN: 3
10. IF YOU CHECKED OFF ANY PROBLEMS, HOW DIFFICULT HAVE THESE PROBLEMS MADE IT FOR YOU TO DO YOUR WORK, TAKE CARE OF THINGS AT HOME, OR GET ALONG WITH OTHER PEOPLE: 2
8. MOVING OR SPEAKING SO SLOWLY THAT OTHER PEOPLE COULD HAVE NOTICED. OR THE OPPOSITE, BEING SO FIGETY OR RESTLESS THAT YOU HAVE BEEN MOVING AROUND A LOT MORE THAN USUAL: 1
SUM OF ALL RESPONSES TO PHQ QUESTIONS 1-9: 19
SUM OF ALL RESPONSES TO PHQ QUESTIONS 1-9: 19
7. TROUBLE CONCENTRATING ON THINGS, SUCH AS READING THE NEWSPAPER OR WATCHING TELEVISION: 3

## 2023-08-29 NOTE — PROGRESS NOTES
Zamzam Weinberg (: 1970) is a 52 y.o. male, established patient, here for evaluation of the following chief complaint(s):  Follow-up       ASSESSMENT/PLAN:  Below is the assessment and plan developed based on review of pertinent history, physical exam, labs, studies, and medications. 1. Current moderate episode of major depressive disorder without prior episode (HCC)  Chronic  -     buPROPion (WELLBUTRIN XL) 150 MG extended release tablet; Take 1 tablet by mouth every morning, Disp-30 tablet, R-3Normal  Ongoiong, PHQ-9: 19.  Continue sertraline 100 mg daily. Add Wellbutrin  mg daily. Patient not currently in psychotherapy (joked that his wife is his therapist). Explained that therapy will be effective at identifying thoughts, feelings and patterns of behavior that are contributing to his symptoms. Patient somewhat receptive to medical advice. 2. Chronic bilateral low back pain without sciatica  Chronic  Discussed conservative treatments including heat therapy and massage. Patient to try these. Patient declines physical therapy. 3. COVID-19 long hauler manifesting chronic dyspnea  Chronic  Patient is suffering from post XVGTF-22 complications including chronic dyspnea, fatigue, and depression. His condition is impacting his quality of life and ability to perform daily activities. Continue follow-up with pulmonology. 4. Requires continuous at home supplemental oxygen  Patient currently on 2 L supplemental O2 via nasal cannula. Return in about 4 months (around 2023) for chronic care follow-up. SUBJECTIVE/OBJECTIVE:  HPI    52-year-old male past medical history depression, COVID-19 long-hauler, chronic respiratory failure on 2 L supplemental home oxygen seen in office today for follow-up. Patient states he was severely ill in 2022 and hospitalized for COVID-19. He has been on supplemental oxygen ever since due to ongoing dyspnea and decreased respiratory effort.

## 2023-08-30 ENCOUNTER — TELEPHONE (OUTPATIENT)
Dept: PRIMARY CARE CLINIC | Facility: CLINIC | Age: 53
End: 2023-08-30

## 2023-08-30 RX ORDER — SERTRALINE HYDROCHLORIDE 100 MG/1
100 TABLET, FILM COATED ORAL DAILY
Qty: 30 TABLET | Refills: 3 | Status: SHIPPED | OUTPATIENT
Start: 2023-08-30

## 2023-08-30 NOTE — TELEPHONE ENCOUNTER
Judd Massey is requesting a refill on Setraline . Currently has 0 days remaining. Patient's last appointment was 8/29/2023  Next visit is scheduled for 1/2/2024   Please send medication to:    [facility]  [address]  Phone: [phone] Fax: [phone]

## 2023-08-31 ENCOUNTER — HOSPITAL ENCOUNTER (OUTPATIENT)
Facility: HOSPITAL | Age: 53
Discharge: HOME | End: 2023-08-31
Attending: INTERNAL MEDICINE
Payer: MEDICAID

## 2023-08-31 DIAGNOSIS — R06.02 SOB (SHORTNESS OF BREATH): ICD-10-CM

## 2023-08-31 PROCEDURE — 71250 CT THORAX DX C-: CPT

## 2024-01-15 SDOH — ECONOMIC STABILITY: INCOME INSECURITY: HOW HARD IS IT FOR YOU TO PAY FOR THE VERY BASICS LIKE FOOD, HOUSING, MEDICAL CARE, AND HEATING?: HARD

## 2024-01-15 SDOH — ECONOMIC STABILITY: HOUSING INSECURITY
IN THE LAST 12 MONTHS, WAS THERE A TIME WHEN YOU DID NOT HAVE A STEADY PLACE TO SLEEP OR SLEPT IN A SHELTER (INCLUDING NOW)?: NO

## 2024-01-15 SDOH — ECONOMIC STABILITY: FOOD INSECURITY: WITHIN THE PAST 12 MONTHS, THE FOOD YOU BOUGHT JUST DIDN'T LAST AND YOU DIDN'T HAVE MONEY TO GET MORE.: SOMETIMES TRUE

## 2024-01-15 SDOH — ECONOMIC STABILITY: FOOD INSECURITY: WITHIN THE PAST 12 MONTHS, YOU WORRIED THAT YOUR FOOD WOULD RUN OUT BEFORE YOU GOT MONEY TO BUY MORE.: SOMETIMES TRUE

## 2024-01-15 SDOH — ECONOMIC STABILITY: TRANSPORTATION INSECURITY
IN THE PAST 12 MONTHS, HAS LACK OF TRANSPORTATION KEPT YOU FROM MEETINGS, WORK, OR FROM GETTING THINGS NEEDED FOR DAILY LIVING?: NO

## 2024-01-17 ENCOUNTER — OFFICE VISIT (OUTPATIENT)
Dept: PRIMARY CARE CLINIC | Facility: CLINIC | Age: 54
End: 2024-01-17
Payer: MEDICAID

## 2024-01-17 VITALS
WEIGHT: 245 LBS | HEART RATE: 65 BPM | BODY MASS INDEX: 31.44 KG/M2 | HEIGHT: 74 IN | SYSTOLIC BLOOD PRESSURE: 128 MMHG | TEMPERATURE: 98.3 F | DIASTOLIC BLOOD PRESSURE: 84 MMHG

## 2024-01-17 DIAGNOSIS — M25.522 LEFT ELBOW PAIN: ICD-10-CM

## 2024-01-17 DIAGNOSIS — E78.2 MIXED HYPERLIPIDEMIA: ICD-10-CM

## 2024-01-17 DIAGNOSIS — F32.1 MAJOR DEPRESSIVE DISORDER, SINGLE EPISODE, MODERATE (HCC): Primary | ICD-10-CM

## 2024-01-17 DIAGNOSIS — Z12.11 ENCOUNTER FOR SCREENING FOR MALIGNANT NEOPLASM OF COLON: ICD-10-CM

## 2024-01-17 DIAGNOSIS — R22.9 MASS OF SKIN: ICD-10-CM

## 2024-01-17 PROCEDURE — 99214 OFFICE O/P EST MOD 30 MIN: CPT | Performed by: NURSE PRACTITIONER

## 2024-01-17 RX ORDER — SERTRALINE HYDROCHLORIDE 100 MG/1
100 TABLET, FILM COATED ORAL DAILY
Qty: 90 TABLET | Refills: 1 | Status: SHIPPED | OUTPATIENT
Start: 2024-01-17

## 2024-01-17 RX ORDER — BUPROPION HYDROCHLORIDE 150 MG/1
150 TABLET ORAL EVERY MORNING
Qty: 90 TABLET | Refills: 1 | Status: SHIPPED | OUTPATIENT
Start: 2024-01-17

## 2024-01-17 ASSESSMENT — PATIENT HEALTH QUESTIONNAIRE - PHQ9
SUM OF ALL RESPONSES TO PHQ QUESTIONS 1-9: 4
9. THOUGHTS THAT YOU WOULD BE BETTER OFF DEAD, OR OF HURTING YOURSELF: 0
SUM OF ALL RESPONSES TO PHQ QUESTIONS 1-9: 4
6. FEELING BAD ABOUT YOURSELF - OR THAT YOU ARE A FAILURE OR HAVE LET YOURSELF OR YOUR FAMILY DOWN: 0
1. LITTLE INTEREST OR PLEASURE IN DOING THINGS: 0
5. POOR APPETITE OR OVEREATING: 1
10. IF YOU CHECKED OFF ANY PROBLEMS, HOW DIFFICULT HAVE THESE PROBLEMS MADE IT FOR YOU TO DO YOUR WORK, TAKE CARE OF THINGS AT HOME, OR GET ALONG WITH OTHER PEOPLE: 0
SUM OF ALL RESPONSES TO PHQ QUESTIONS 1-9: 4
8. MOVING OR SPEAKING SO SLOWLY THAT OTHER PEOPLE COULD HAVE NOTICED. OR THE OPPOSITE, BEING SO FIGETY OR RESTLESS THAT YOU HAVE BEEN MOVING AROUND A LOT MORE THAN USUAL: 0
3. TROUBLE FALLING OR STAYING ASLEEP: 1
SUM OF ALL RESPONSES TO PHQ9 QUESTIONS 1 & 2: 1
4. FEELING TIRED OR HAVING LITTLE ENERGY: 1
2. FEELING DOWN, DEPRESSED OR HOPELESS: 1
7. TROUBLE CONCENTRATING ON THINGS, SUCH AS READING THE NEWSPAPER OR WATCHING TELEVISION: 0
SUM OF ALL RESPONSES TO PHQ QUESTIONS 1-9: 4

## 2024-01-17 ASSESSMENT — ENCOUNTER SYMPTOMS
SHORTNESS OF BREATH: 1
GASTROINTESTINAL NEGATIVE: 1

## 2024-01-17 NOTE — PROGRESS NOTES
Ti Marshall is a 53 y.o. male presents for    Chief Complaint   Patient presents with    Other     Establish care and wants to get blood work done and discuss a few things.  Referral for colo    .    ASSESSMENT and PLAN  Ti was seen today for other.    Diagnoses and all orders for this visit:    Major depressive disorder, single episode, moderate (HCC)  Comments:  symptoms controlled on current regimen. Denies SI/HI.  Orders:  -     buPROPion (WELLBUTRIN XL) 150 MG extended release tablet; Take 1 tablet by mouth every morning  -     sertraline (ZOLOFT) 100 MG tablet; Take 1 tablet by mouth daily    Mixed hyperlipidemia  Comments:  unclear control; will recheck labs.  Orders:  -     CBC  -     Comprehensive Metabolic Panel  -     Lipid Panel    Left elbow pain  Comments:  Discussed ice, compression, ibuprofen for pain  F.u INI.  Orders:  -     XR ELBOW LEFT (MIN 3 VIEWS); Future    Mass of skin  Comments:  will refer to gen surg  Orders:  -     [facility]    Encounter for screening for malignant neoplasm of colon  -     AFL - Bubba Curiel MD, Gastroenterology, [place]             HISTORY OF PRESENT ILLNESS  Ti Marshall is a 53 y.o. male presents for    Chief Complaint   Patient presents with    Other     Establish care and wants to get blood work done and discuss a few things.  Referral for colo    .    Patient sees pulmonologist every 6 months. He is scheduled for a sleep study. He is on home oxygen 2L at baseline. His shortness of breath is chronic and at baseline.     Depression: he is on wellbutrin and zoloft. He feels this is controlled at this time. He needs a refill. He denies SI/HI.     Lt elbow pain: Pt pushed up on his left elbow a few weeks ago and reports left elbow pain since. It is tender to the touch.    Mass: Patient reports a mass to left upper back since 2007. It has grown in size. It is not painful.       Vitals:    01/17/24 1515

## 2024-01-17 NOTE — PROGRESS NOTES
Identified Patient with two Patient identifiers(name and ).     1. Have you been to the ER, urgent care clinic since your last visit?  Hospitalized since your last visit?No    2. Have you seen or consulted any other health care providers outside of the [facility] since your last visit?   No     3. For patients aged 45-75: Has the patient had a colonoscopy / FIT/ Cologuard? No    If the patient is female:    4. For patients aged 40-74: Has the patient had a mammogram within the past 2 years?  NA - based on age/sex      5. For patients aged 21-65: Has the patient had a pap smear?  NA - based on age/sex   There were no vitals taken for this visit.    Chief Complaint   Patient presents with    Other     Establish care and wants to get blood work done and discuss a few things.        Health Maintenance Due   Topic Date Due    Hepatitis B vaccine (1 of 3 - 3-dose series) Never done    COVID-19 Vaccine (1) Never done    HIV screen  Never done    DTaP/Tdap/Td vaccine (1 - Tdap) Never done    Shingles vaccine (1 of 2) Never done    A1C test (Diabetic or Prediabetic)  2023    Flu vaccine (1) Never done      [facility] Social Determinants Of Health (Sdoh) Screening Questionnaire       Question 1/15/2024  7:36 PM EST - Filed by Patient    How hard is it for you to pay for the very basics like food, housing, medical care, and heating? Hard    Within the past 12 months, you worried that your food would run out before you got the money to buy more. Sometimes true    Within the past 12 months, the food you bought just didn’t last and you didn’t have money to get more. Sometimes true    In the past 12 months, has lack of transportation kept you from meetings, work, or from getting things needed for daily living? No    In the last 12 months, was there a time when you did not have a steady place to sleep or slept in a shelter (including now)? No    Would you like resources for any of these topics? Financial

## 2024-01-18 LAB
ALBUMIN SERPL-MCNC: 4.2 G/DL (ref 3.8–4.9)
ALBUMIN/GLOB SERPL: 1.8 {RATIO} (ref 1.2–2.2)
ALP SERPL-CCNC: 83 IU/L (ref 44–121)
ALT SERPL-CCNC: 17 IU/L (ref 0–44)
AST SERPL-CCNC: 17 IU/L (ref 0–40)
BILIRUB SERPL-MCNC: 0.2 MG/DL (ref 0–1.2)
BUN SERPL-MCNC: 11 MG/DL (ref 6–24)
BUN/CREAT SERPL: 11 (ref 9–20)
CALCIUM SERPL-MCNC: 9.8 MG/DL (ref 8.7–10.2)
CHLORIDE SERPL-SCNC: 105 MMOL/L (ref 96–106)
CHOLEST SERPL-MCNC: 173 MG/DL (ref 100–199)
CO2 SERPL-SCNC: 26 MMOL/L (ref 20–29)
CREAT SERPL-MCNC: 1.04 MG/DL (ref 0.76–1.27)
EGFRCR SERPLBLD CKD-EPI 2021: 85 ML/MIN/1.73
ERYTHROCYTE [DISTWIDTH] IN BLOOD BY AUTOMATED COUNT: 12.3 % (ref 11.6–15.4)
GLOBULIN SER CALC-MCNC: 2.4 G/DL (ref 1.5–4.5)
GLUCOSE SERPL-MCNC: 105 MG/DL (ref 70–99)
HCT VFR BLD AUTO: 41 % (ref 37.5–51)
HDLC SERPL-MCNC: 49 MG/DL
HGB BLD-MCNC: 14 G/DL (ref 13–17.7)
LDLC SERPL CALC-MCNC: 104 MG/DL (ref 0–99)
MCH RBC QN AUTO: 31.7 PG (ref 26.6–33)
MCHC RBC AUTO-ENTMCNC: 34.1 G/DL (ref 31.5–35.7)
MCV RBC AUTO: 93 FL (ref 79–97)
PLATELET # BLD AUTO: 162 X10E3/UL (ref 150–450)
POTASSIUM SERPL-SCNC: 4.3 MMOL/L (ref 3.5–5.2)
PROT SERPL-MCNC: 6.6 G/DL (ref 6–8.5)
RBC # BLD AUTO: 4.42 X10E6/UL (ref 4.14–5.8)
SODIUM SERPL-SCNC: 141 MMOL/L (ref 134–144)
TRIGL SERPL-MCNC: 112 MG/DL (ref 0–149)
VLDLC SERPL CALC-MCNC: 20 MG/DL (ref 5–40)
WBC # BLD AUTO: 4.5 X10E3/UL (ref 3.4–10.8)

## 2024-01-29 ENCOUNTER — OFFICE VISIT (OUTPATIENT)
Age: 54
End: 2024-01-29
Payer: MEDICAID

## 2024-01-29 VITALS
RESPIRATION RATE: 16 BRPM | SYSTOLIC BLOOD PRESSURE: 121 MMHG | HEIGHT: 74 IN | BODY MASS INDEX: 31.83 KG/M2 | HEART RATE: 51 BPM | TEMPERATURE: 98.1 F | DIASTOLIC BLOOD PRESSURE: 81 MMHG | OXYGEN SATURATION: 97 % | WEIGHT: 248 LBS

## 2024-01-29 DIAGNOSIS — D17.1 LIPOMA OF BACK: Primary | ICD-10-CM

## 2024-01-29 PROCEDURE — 99204 OFFICE O/P NEW MOD 45 MIN: CPT | Performed by: SURGERY

## 2024-01-29 ASSESSMENT — PATIENT HEALTH QUESTIONNAIRE - PHQ9
5. POOR APPETITE OR OVEREATING: 1
SUM OF ALL RESPONSES TO PHQ QUESTIONS 1-9: 7
10. IF YOU CHECKED OFF ANY PROBLEMS, HOW DIFFICULT HAVE THESE PROBLEMS MADE IT FOR YOU TO DO YOUR WORK, TAKE CARE OF THINGS AT HOME, OR GET ALONG WITH OTHER PEOPLE: 0
7. TROUBLE CONCENTRATING ON THINGS, SUCH AS READING THE NEWSPAPER OR WATCHING TELEVISION: 0
8. MOVING OR SPEAKING SO SLOWLY THAT OTHER PEOPLE COULD HAVE NOTICED. OR THE OPPOSITE, BEING SO FIGETY OR RESTLESS THAT YOU HAVE BEEN MOVING AROUND A LOT MORE THAN USUAL: 1
9. THOUGHTS THAT YOU WOULD BE BETTER OFF DEAD, OR OF HURTING YOURSELF: 0
6. FEELING BAD ABOUT YOURSELF - OR THAT YOU ARE A FAILURE OR HAVE LET YOURSELF OR YOUR FAMILY DOWN: 1
SUM OF ALL RESPONSES TO PHQ QUESTIONS 1-9: 7
SUM OF ALL RESPONSES TO PHQ QUESTIONS 1-9: 7
4. FEELING TIRED OR HAVING LITTLE ENERGY: 1
2. FEELING DOWN, DEPRESSED OR HOPELESS: 1
1. LITTLE INTEREST OR PLEASURE IN DOING THINGS: 1
SUM OF ALL RESPONSES TO PHQ QUESTIONS 1-9: 7
SUM OF ALL RESPONSES TO PHQ9 QUESTIONS 1 & 2: 2
3. TROUBLE FALLING OR STAYING ASLEEP: 1

## 2024-01-30 ASSESSMENT — ENCOUNTER SYMPTOMS
ABDOMINAL PAIN: 0
EYE REDNESS: 0
WHEEZING: 0
SHORTNESS OF BREATH: 1
NAUSEA: 0
SORE THROAT: 0
BACK PAIN: 0
VOMITING: 0
COUGH: 0

## 2024-01-31 NOTE — PROGRESS NOTES
[facility] [place]  Ghada Duenas, DO  [address], [place] [address]  [phone]      Patient Name: Ti Marshall (53 y.o., male)    PCP: India Hendrix APRN - CNP       History of Present Illness  Pt is a 53 year old male with a PMH of borderline HTN and hypoxia due to COVID-19 requiring chronic 2L of O2 presenting with a 17 year history of a slowly enlarging baseball sized mobile non tender soft tissue mass on the left upper back. Pt says that he has no other masses anywhere else on the body. He has no PSH and says he has no family history of adverse events during surgery from anesthesia or intubation. He also has a family history of cardiac events but has not had any himself. He is not on blood thinners. He states that his SPO2 typically runs around 97% while on 2L O2 but sometimes does not wear it when he is sitting or lying down which will sometimes cause him to have headaches. Pt denies N/V/D, night sweats, unintentional weight loss, neurological symptoms down extremities.    Past Medical History:   Diagnosis Date    Hypoxia        History reviewed. No pertinent surgical history.    Family History   Problem Relation Age of Onset    Diabetes Mother     Cancer Mother     Heart Disease Mother     High Blood Pressure Mother     Kidney Disease Mother     Diabetes Father     Cancer Father     Heart Disease Father     High Blood Pressure Father        Social History     Tobacco Use    Smoking status: Never    Smokeless tobacco: Never   Substance Use Topics    Alcohol use: Not Currently    Drug use: Never       No Known Allergies    Prior to Visit Medications    Medication Sig Taking? Authorizing Provider   buPROPion (WELLBUTRIN XL) 150 MG extended release tablet Take 1 tablet by mouth every morning Yes India Hendrix APRN - CNP   sertraline (ZOLOFT) 100 MG tablet Take 1 tablet by mouth daily Yes India Hendrix APRN - CNP   ipratropium (ATROVENT) 0.02 % 
Identified pt with two pt identifiers (name and ). Reviewed chart in preparation for visit and have obtained necessary documentation.    Ti Marshall is a 53 y.o. male  Chief Complaint   Patient presents with    New Patient     Skin Mass left shoulder     /81 (Site: Left Upper Arm, Position: Sitting, Cuff Size: Large Adult)   Pulse 51   Temp 98.1 °F (36.7 °C) (Oral)   Resp 16   Ht 1.88 m (6' 2\")   Wt 112.5 kg (248 lb)   SpO2 97%   BMI 31.84 kg/m²     1. Have you been to the ER, urgent care clinic since your last visit?  Hospitalized since your last visit?no    2. Have you seen or consulted any other health care providers outside of the [facility] since your last visit?  Include any pap smears or colon screening. no  
back   Lymphadenopathy:      Cervical: No cervical adenopathy.   Skin:     Coloration: Skin is not jaundiced or pale.      Findings: Lesion present. No bruising, erythema or rash.   Neurological:      Mental Status: He is alert.      Cranial Nerves: No cranial nerve deficit.      Sensory: No sensory deficit.      Motor: No weakness.      Coordination: Coordination normal.      Gait: Gait normal.      Deep Tendon Reflexes: Reflexes normal.   Psychiatric:         Mood and Affect: Mood normal.         Behavior: Behavior normal.         Thought Content: Thought content normal.         Judgment: Judgment normal.            Assessment  Problem List Items Addressed This Visit    None  Visit Diagnoses       Lipoma of back    -  Primary                Plan  Pt will follow up in several months once they figure out insurance to discuss surgically removing lipoma. Because of size and location of mass will likely have to be done in OR vs office.    Miguel Angel Leigh

## 2024-04-17 ENCOUNTER — OFFICE VISIT (OUTPATIENT)
Dept: PRIMARY CARE CLINIC | Facility: CLINIC | Age: 54
End: 2024-04-17
Payer: COMMERCIAL

## 2024-04-17 VITALS
BODY MASS INDEX: 30.31 KG/M2 | OXYGEN SATURATION: 99 % | HEART RATE: 48 BPM | SYSTOLIC BLOOD PRESSURE: 120 MMHG | DIASTOLIC BLOOD PRESSURE: 82 MMHG | HEIGHT: 74 IN | TEMPERATURE: 98 F | WEIGHT: 236.2 LBS

## 2024-04-17 DIAGNOSIS — F32.1 MAJOR DEPRESSIVE DISORDER, SINGLE EPISODE, MODERATE (HCC): Primary | ICD-10-CM

## 2024-04-17 DIAGNOSIS — R00.1 BRADYCARDIA: ICD-10-CM

## 2024-04-17 DIAGNOSIS — M72.2 PLANTAR FASCIITIS OF RIGHT FOOT: ICD-10-CM

## 2024-04-17 DIAGNOSIS — I44.0 FIRST DEGREE AV BLOCK: ICD-10-CM

## 2024-04-17 PROCEDURE — 93000 ELECTROCARDIOGRAM COMPLETE: CPT | Performed by: NURSE PRACTITIONER

## 2024-04-17 PROCEDURE — 99215 OFFICE O/P EST HI 40 MIN: CPT | Performed by: NURSE PRACTITIONER

## 2024-04-17 RX ORDER — SERTRALINE HYDROCHLORIDE 100 MG/1
100 TABLET, FILM COATED ORAL DAILY
Qty: 90 TABLET | Refills: 1 | Status: SHIPPED | OUTPATIENT
Start: 2024-04-17

## 2024-04-17 RX ORDER — BUPROPION HYDROCHLORIDE 150 MG/1
150 TABLET ORAL EVERY MORNING
Qty: 90 TABLET | Refills: 1 | Status: SHIPPED | OUTPATIENT
Start: 2024-04-17

## 2024-04-17 ASSESSMENT — PATIENT HEALTH QUESTIONNAIRE - PHQ9
1. LITTLE INTEREST OR PLEASURE IN DOING THINGS: NOT AT ALL
SUM OF ALL RESPONSES TO PHQ QUESTIONS 1-9: 2
SUM OF ALL RESPONSES TO PHQ QUESTIONS 1-9: 2
9. THOUGHTS THAT YOU WOULD BE BETTER OFF DEAD, OR OF HURTING YOURSELF: NOT AT ALL
10. IF YOU CHECKED OFF ANY PROBLEMS, HOW DIFFICULT HAVE THESE PROBLEMS MADE IT FOR YOU TO DO YOUR WORK, TAKE CARE OF THINGS AT HOME, OR GET ALONG WITH OTHER PEOPLE: NOT DIFFICULT AT ALL
SUM OF ALL RESPONSES TO PHQ QUESTIONS 1-9: 2
2. FEELING DOWN, DEPRESSED OR HOPELESS: SEVERAL DAYS
7. TROUBLE CONCENTRATING ON THINGS, SUCH AS READING THE NEWSPAPER OR WATCHING TELEVISION: NOT AT ALL
4. FEELING TIRED OR HAVING LITTLE ENERGY: NOT AT ALL
SUM OF ALL RESPONSES TO PHQ9 QUESTIONS 1 & 2: 1
6. FEELING BAD ABOUT YOURSELF - OR THAT YOU ARE A FAILURE OR HAVE LET YOURSELF OR YOUR FAMILY DOWN: NOT AT ALL
SUM OF ALL RESPONSES TO PHQ QUESTIONS 1-9: 2
5. POOR APPETITE OR OVEREATING: NOT AT ALL
8. MOVING OR SPEAKING SO SLOWLY THAT OTHER PEOPLE COULD HAVE NOTICED. OR THE OPPOSITE, BEING SO FIGETY OR RESTLESS THAT YOU HAVE BEEN MOVING AROUND A LOT MORE THAN USUAL: NOT AT ALL

## 2024-04-17 ASSESSMENT — ENCOUNTER SYMPTOMS
SHORTNESS OF BREATH: 1
WHEEZING: 0
COUGH: 0

## 2024-04-17 NOTE — PROGRESS NOTES
Ti Marshall is a 54 y.o. male presents for    Chief Complaint   Patient presents with    Follow-up     Having pain right foot, feet are swelling along with legs,a whole week that could hardly get relief on back and kept lying on the floor. Sometimes feel short winded also one day was having chest pain heart feels like flutters sometimes. There are times where he has headaches not sure if it tension especially when sit around in house. Stool is not as soft as use to be. [employer] for neb machine    .    ASSESSMENT and PLAN  Ti was seen today for follow-up.    Diagnoses and all orders for this visit:    Major depressive disorder, single episode, moderate (HCC)  Comments:  symptoms controlled on current regimen. Denies SI/HI.  Orders:  -     buPROPion (WELLBUTRIN XL) 150 MG extended release tablet; Take 1 tablet by mouth every morning  -     sertraline (ZOLOFT) 100 MG tablet; Take 1 tablet by mouth daily    Plantar fasciitis of right foot  Comments:  provided patient with stretching exercises and rec ice to area. F/u INI or symptoms worsen.    First degree AV block  Comments:  with bradycardia and pt is symptomatic (fatigue). referring to cardiology. ED precautions given  Orders:  -     Miguel Vargas MD, Cardiology, [address]    Bradycardia  -     AMB POC EKG ROUTINE  -     Miguel Vargas MD, Cardiology, [address]             HISTORY OF PRESENT ILLNESS  Ti Marshall is a 54 y.o. male presents for    Chief Complaint   Patient presents with    Follow-up     Having pain right foot, feet are swelling along with legs,a whole week that could hardly get relief on back and kept lying on the floor. Sometimes feel short winded also one day was having chest pain heart feels like flutters sometimes. There are times where he has headaches not sure if it tension especially when sit around in house. Stool is not as soft as use to be. [employer] for neb machine    .  Patient seeing pulmonology. On 2l NC at

## 2024-04-17 NOTE — PROGRESS NOTES
\"Have you been to the ER, urgent care clinic since your last visit?  Hospitalized since your last visit?\"    NO    “Have you seen or consulted any other health care providers outside of [facility] since your last visit?”    NO            Click Here for Release of Records Request

## 2024-04-19 NOTE — PROGRESS NOTES
[id number], [address]  [place] [address]     Office [phone],Fax [phone]     Primary Cardiologist:  Jett Edwards DO  Last Office Visit: 5/22/23            Ti Marshall is a 52 y.o. male presents to the office for follow-up evaluation         Assessment/Recommendations:         Diagnosis Orders   1. Bradycardia  TSH      2. PVC (premature ventricular contraction)                 Exertional dyspnea.  Previous stress testing echocardiogram showed structurally normal heart.  With prior normal myocardial perfusion imaging.     Covid-19 1/2022, hx of long covid w/ persistent hypoxic respiratory failure.  Remains on oxygen.     Sinus bradycardia w.  Chronotropic competence.  No history of syncope nor near syncope.  HR in 40s with PVC on recent EKG with PCP. Complains of fatigue. Will check 72 hour holter.           Primary Care Physician- Faith Beyer MD      Follow-up as needed, will review results on mychart.             Subjective:   52 y.o. presents to the office for follow-up evaluation.  Remains on oxygen for management of lung covid-19 with persistent hypoxic respiratory failure.  Referred back to cardiology due to recent EKG showing heart rates in the 40s. Feels that his energy levels are low. Denies dizziness, syncope.   Says he had some chest pain, feels sharp. Lasts 5-10 seconds. Describes no specific pattern.   Denies exertional chest pain symptoms.         Current Outpatient Medications:      multivits,Stress Formula-Zinc tablet, Take  by mouth daily., Disp: , Rfl:      docusate sodium (Colace) 100 mg capsule, Take 100 mg by mouth two (2) times a day., Disp: , Rfl:      multivitamin (ONE A DAY) tablet, Take 1 Tablet by mouth daily., Disp: , Rfl:       No Known Allergies       History reviewed. No pertinent family history.             Review of Symptoms:   Pertinent Positive: Exertional dyspnea   Pertinent Negative: Chest pain, chest pressure, orthopnea, PND   All Other

## 2024-04-23 ENCOUNTER — OFFICE VISIT (OUTPATIENT)
Age: 54
End: 2024-04-23
Payer: COMMERCIAL

## 2024-04-23 VITALS
WEIGHT: 234 LBS | SYSTOLIC BLOOD PRESSURE: 126 MMHG | RESPIRATION RATE: 16 BRPM | OXYGEN SATURATION: 97 % | HEART RATE: 51 BPM | HEIGHT: 74 IN | DIASTOLIC BLOOD PRESSURE: 88 MMHG | BODY MASS INDEX: 30.03 KG/M2

## 2024-04-23 DIAGNOSIS — I49.3 PVC (PREMATURE VENTRICULAR CONTRACTION): ICD-10-CM

## 2024-04-23 DIAGNOSIS — R06.00 DYSPNEA, UNSPECIFIED TYPE: ICD-10-CM

## 2024-04-23 DIAGNOSIS — R00.1 BRADYCARDIA: Primary | ICD-10-CM

## 2024-04-23 PROCEDURE — 99214 OFFICE O/P EST MOD 30 MIN: CPT

## 2024-04-23 NOTE — PROGRESS NOTES
had concerns including Shortness of Breath and sinus brent.    Vitals:    04/23/24 1255   BP: 126/88   Site: Left Upper Arm   Position: Sitting   Pulse: 51   Resp: 16   SpO2: 97%   Weight: 106.1 kg (234 lb)   Height: 1.88 m (6' 2\")        Chest pain No    Refills No        1. Have you been to the ER, urgent care clinic since your last visit? No       Hospitalized since your last visit? No       Where?     Urgent care [place]   Date? Fall 2023  For swelling in legs and chest pains.

## 2024-04-29 ENCOUNTER — TELEPHONE (OUTPATIENT)
Age: 54
End: 2024-04-29

## 2024-04-29 NOTE — TELEPHONE ENCOUNTER
----- Message from BEE Ferrari NP sent at 4/23/2024  1:52 PM EDT -----  Hi can you please send pt a 72 hour monitor for bradycardia, PVC   Thanks  Yohana

## 2024-05-15 LAB — TSH SERPL DL<=0.005 MIU/L-ACNC: 1.5 UIU/ML (ref 0.45–4.5)

## 2024-05-15 NOTE — RESULT ENCOUNTER NOTE
Dear Mr. Marshall,  Good News!  Your thyroid function is normal.   Please let me know if you have any questions.  Best Regards,  BEE Ferrari NP

## 2024-06-04 RX ORDER — FLUTICASONE PROPIONATE AND SALMETEROL 50; 100 UG/1; UG/1
POWDER RESPIRATORY (INHALATION)
Qty: 60 EACH | Refills: 1 | Status: SHIPPED | OUTPATIENT
Start: 2024-06-04

## 2024-06-04 RX ORDER — ALBUTEROL SULFATE 90 UG/1
2 AEROSOL, METERED RESPIRATORY (INHALATION) EVERY 4 HOURS PRN
Qty: 18 G | Refills: 1 | Status: SHIPPED | OUTPATIENT
Start: 2024-06-04

## 2024-06-04 NOTE — TELEPHONE ENCOUNTER
Ti Marshall is requesting a refill on  . ADVAIR DISKUS 100-50 MCG/ACT AEPB diskus inhaler    &   albuterol (PROVENTIL) (2.5 MG/3ML) 0.083% nebulizer solution      Please send medication to:     [id number] - [place], [place] - [address] [place] [address] [phone] - F [phone]  [address] [place] [address]  Phone: [phone]  Fax: [phone]         Patient's last appointment was 4/17/2024   Next visit is scheduled for 10/17/2024

## 2024-09-11 ENCOUNTER — TELEPHONE (OUTPATIENT)
Dept: PRIMARY CARE CLINIC | Facility: CLINIC | Age: 54
End: 2024-09-11

## 2024-09-16 ENCOUNTER — TELEPHONE (OUTPATIENT)
Dept: PRIMARY CARE CLINIC | Facility: CLINIC | Age: 54
End: 2024-09-16

## 2024-09-16 DIAGNOSIS — R51.9 NONINTRACTABLE EPISODIC HEADACHE, UNSPECIFIED HEADACHE TYPE: Primary | ICD-10-CM

## 2024-10-24 ENCOUNTER — OFFICE VISIT (OUTPATIENT)
Dept: PRIMARY CARE CLINIC | Facility: CLINIC | Age: 54
End: 2024-10-24
Payer: COMMERCIAL

## 2024-10-24 VITALS
OXYGEN SATURATION: 99 % | BODY MASS INDEX: 30.39 KG/M2 | DIASTOLIC BLOOD PRESSURE: 83 MMHG | RESPIRATION RATE: 16 BRPM | WEIGHT: 236.8 LBS | TEMPERATURE: 97.8 F | HEIGHT: 74 IN | SYSTOLIC BLOOD PRESSURE: 136 MMHG | HEART RATE: 53 BPM

## 2024-10-24 DIAGNOSIS — R06.09 COVID-19 LONG HAULER MANIFESTING CHRONIC DYSPNEA: ICD-10-CM

## 2024-10-24 DIAGNOSIS — R00.1 SYMPTOMATIC BRADYCARDIA: ICD-10-CM

## 2024-10-24 DIAGNOSIS — U09.9 COVID-19 LONG HAULER MANIFESTING CHRONIC DYSPNEA: ICD-10-CM

## 2024-10-24 DIAGNOSIS — Z99.81 REQUIRES CONTINUOUS AT HOME SUPPLEMENTAL OXYGEN: ICD-10-CM

## 2024-10-24 DIAGNOSIS — G89.29 CHRONIC BILATERAL LOW BACK PAIN WITHOUT SCIATICA: Primary | ICD-10-CM

## 2024-10-24 DIAGNOSIS — M54.50 CHRONIC BILATERAL LOW BACK PAIN WITHOUT SCIATICA: Primary | ICD-10-CM

## 2024-10-24 DIAGNOSIS — D17.1 LIPOMA OF TORSO: ICD-10-CM

## 2024-10-24 PROCEDURE — 99214 OFFICE O/P EST MOD 30 MIN: CPT | Performed by: FAMILY MEDICINE

## 2024-10-24 NOTE — PROGRESS NOTES
\"Have you been to the ER, urgent care clinic since your last visit?  Hospitalized since your last visit?\"    NO    “Have you seen or consulted any other health care providers outside of [facility] since your last visit?”    NO            
SpO2 towards the end of 6 minutes.  Will reorder oxygen.  Continue with pulmonology.  Orders:  -     6 Minute Walk Test        Aspects of this note have been generated using voice recognition software. Despite editing, there may be some syntax errors    Donny Cho MD

## 2024-10-24 NOTE — ASSESSMENT & PLAN NOTE
X-ray of lumbar spine notable for moderate degenerative disc disease.  Discussed options, patient would like an MRI to see levels of impingement.  Declines medication at this time.  Wary of PT, as he has had bradycardia issues with physical therapy in the past.

## 2024-10-24 NOTE — ASSESSMENT & PLAN NOTE
Controlled with O2. 6-minute walk performed today demonstrated desat to 80% SpO2 towards the end of 6 minutes. Will reorder oxygen. Continue with pulmonology.

## 2024-10-24 NOTE — ASSESSMENT & PLAN NOTE
Patient reports intermittent episodes of nausea, shortness of breath and presyncope.  He reports during these times that his pulse has dropped to 40.  Recent monitor by cardiology demonstrates bradycardia down to 38.  Does not appear to be medications.  Thyroid normal.  Will reach out to cardiology to see if they consider treating as sick sinus syndrome with possible pacemaker.

## 2024-10-24 NOTE — ASSESSMENT & PLAN NOTE
Controlled with O2.  6-minute walk performed today demonstrated desat to 80% SpO2 towards the end of 6 minutes.  Will reorder oxygen.

## 2024-10-28 ENCOUNTER — TELEPHONE (OUTPATIENT)
Dept: PRIMARY CARE CLINIC | Facility: CLINIC | Age: 54
End: 2024-10-28

## 2024-10-28 ENCOUNTER — TELEPHONE (OUTPATIENT)
Age: 54
End: 2024-10-28

## 2024-10-28 NOTE — TELEPHONE ENCOUNTER
Spoke with the Pt, declined to make an appointment with an EP  At this time.     Will call back if he changes his mind.

## 2024-10-28 NOTE — TELEPHONE ENCOUNTER
Viewing:All Activity  Supplier API Client  [facility]  10/28 ? 2:02PM  updated delivered date to 10/28/2024  Alyssia Rdz   [facility]  10/25 ? 8:16AM  accepted order successfully. #[id number]  Donny Chris [facility]  10/24 ? 2:40PM  submitted to Jassi ALONZO  10/24 ? 2:40PM  approved order  Donny Chris [facility]  10/24 ? 2:33PM  created order

## 2024-10-28 NOTE — TELEPHONE ENCOUNTER
Wife calling to check on this again and see if it was sent to [facility] again.

## 2024-10-28 NOTE — TELEPHONE ENCOUNTER
Contacted patient's wife to update that the status from [facility] is that it was delivered today, 10/28/24. Mrs. Marshall is going to drive to [facility] tomorrow to check because when she calls \"Nathan\" keeps telling her that they do not have an order from Dr. Cho for the patient's Oxygen.     **See other encounter where details were documented with order number and confirmation from [facility]. Order was put through paracte and confirmed to be received by [facility] on 10/25/24.**

## 2024-10-29 ENCOUNTER — TELEPHONE (OUTPATIENT)
Dept: PRIMARY CARE CLINIC | Facility: CLINIC | Age: 54
End: 2024-10-29

## 2024-10-29 NOTE — TELEPHONE ENCOUNTER
Estee from [facility] said they received the office notes but they need notes from walk test? I did not see notes for the test if you know what to send handy can you fax to [phone] cate Madrigal cb number is [phone]

## 2024-10-29 NOTE — TELEPHONE ENCOUNTER
Spoke with Estee, she states that she will check on last faxed notes and she will be sending new RX to be signed, she has been informed that DR Cho is out of the office for the rest of the week and that this can be signed on Monday 11/04/24

## 2024-10-29 NOTE — TELEPHONE ENCOUNTER
Spoke with pt wife, she states that she has been unable to get oxygen for pt due to [employer] needing information. I spoke with [employer] [phone], was advised that they are needing to have last office notes faxed with walk testing noted to [phone], faxed as requested, pt notified.

## 2024-11-04 ENCOUNTER — TELEPHONE (OUTPATIENT)
Dept: PRIMARY CARE CLINIC | Facility: CLINIC | Age: 54
End: 2024-11-04

## 2024-12-02 ENCOUNTER — TELEMEDICINE (OUTPATIENT)
Dept: PRIMARY CARE CLINIC | Facility: CLINIC | Age: 54
End: 2024-12-02
Payer: COMMERCIAL

## 2024-12-02 DIAGNOSIS — G89.29 CHRONIC BILATERAL LOW BACK PAIN WITHOUT SCIATICA: Primary | ICD-10-CM

## 2024-12-02 DIAGNOSIS — Z99.81 REQUIRES CONTINUOUS AT HOME SUPPLEMENTAL OXYGEN: ICD-10-CM

## 2024-12-02 DIAGNOSIS — R06.09 COVID-19 LONG HAULER MANIFESTING CHRONIC DYSPNEA: ICD-10-CM

## 2024-12-02 DIAGNOSIS — M54.50 CHRONIC BILATERAL LOW BACK PAIN WITHOUT SCIATICA: Primary | ICD-10-CM

## 2024-12-02 DIAGNOSIS — U09.9 COVID-19 LONG HAULER MANIFESTING CHRONIC DYSPNEA: ICD-10-CM

## 2024-12-02 PROCEDURE — 99213 OFFICE O/P EST LOW 20 MIN: CPT | Performed by: FAMILY MEDICINE

## 2024-12-02 NOTE — ASSESSMENT & PLAN NOTE
Still symptomatic.  Patient has not had a chance to get the MRI yet.  He will call and schedule for it in the future.

## 2024-12-02 NOTE — PROGRESS NOTES
\"Have you been to the ER, urgent care clinic since your last visit?  Hospitalized since your last visit?\"    NO    “Have you seen or consulted any other health care providers outside our system since your last visit?”    NO           
observable physical exam findings:-              Assessment & Plan  1. Chronic bilateral low back pain without sciatica  Assessment & Plan:   Still symptomatic.  Patient has not had a chance to get the MRI yet.  He will call and schedule for it in the future.  2. COVID-19 long hauler manifesting chronic dyspnea  Assessment & Plan:   Controlled on home oxygen.  Will continue with current treatment.  3. Requires continuous at home supplemental oxygen  Assessment & Plan:   Controlled on home oxygen.  Will continue with current treatment.    21 minutes spent on encounter    Follow-up and Dispositions    Return in about 3 months (around 3/2/2025).           We discussed the expected course, resolution and complications of the diagnosis(es) in detail.  Medication risks, benefits, costs, interactions, and alternatives were discussed as indicated.  I advised him to contact the office if his condition worsens, changes or fails to improve as anticipated. He expressed understanding with the diagnosis(es) and plan.     Services were provided through a video synchronous discussion virtually to substitute for in-person clinic visit.   Patient and provider were located at their individual homes.    Aspects of this note have been generated using voice recognition software. Despite editing, there may be some syntax errors    Donny Cho MD

## 2025-01-02 ENCOUNTER — TELEPHONE (OUTPATIENT)
Dept: PRIMARY CARE CLINIC | Facility: CLINIC | Age: 55
End: 2025-01-02

## 2025-01-21 ENCOUNTER — TELEPHONE (OUTPATIENT)
Dept: PRIMARY CARE CLINIC | Facility: CLINIC | Age: 55
End: 2025-01-21

## 2025-01-21 NOTE — TELEPHONE ENCOUNTER
Per pt wife insurance will now pay for Advair a new script needs to be sent to [facility] in [place]

## 2025-01-22 RX ORDER — CEPHALEXIN 250 MG/1
CAPSULE ORAL
Qty: 60 EACH | Refills: 1 | Status: SHIPPED | OUTPATIENT
Start: 2025-01-22

## 2025-02-21 DIAGNOSIS — F32.1 MAJOR DEPRESSIVE DISORDER, SINGLE EPISODE, MODERATE (HCC): ICD-10-CM

## 2025-02-25 RX ORDER — SERTRALINE HYDROCHLORIDE 100 MG/1
100 TABLET, FILM COATED ORAL DAILY
Qty: 90 TABLET | Refills: 0 | Status: SHIPPED | OUTPATIENT
Start: 2025-02-25

## 2025-02-25 RX ORDER — BUPROPION HYDROCHLORIDE 150 MG/1
150 TABLET ORAL EVERY MORNING
Qty: 30 TABLET | Refills: 0 | Status: SHIPPED | OUTPATIENT
Start: 2025-02-25

## 2025-04-02 DIAGNOSIS — F32.1 MAJOR DEPRESSIVE DISORDER, SINGLE EPISODE, MODERATE (HCC): ICD-10-CM

## 2025-04-02 RX ORDER — BUPROPION HYDROCHLORIDE 150 MG/1
150 TABLET ORAL EVERY MORNING
Qty: 30 TABLET | Refills: 0 | Status: SHIPPED | OUTPATIENT
Start: 2025-04-02

## 2025-05-13 DIAGNOSIS — F32.1 MAJOR DEPRESSIVE DISORDER, SINGLE EPISODE, MODERATE (HCC): ICD-10-CM

## 2025-05-13 RX ORDER — BUPROPION HYDROCHLORIDE 150 MG/1
150 TABLET ORAL EVERY MORNING
Qty: 90 TABLET | Refills: 1 | Status: SHIPPED | OUTPATIENT
Start: 2025-05-13

## 2025-05-13 RX ORDER — SERTRALINE HYDROCHLORIDE 100 MG/1
100 TABLET, FILM COATED ORAL DAILY
Qty: 90 TABLET | Refills: 2 | Status: SHIPPED | OUTPATIENT
Start: 2025-05-13

## 2025-06-24 ENCOUNTER — OFFICE VISIT (OUTPATIENT)
Dept: PRIMARY CARE CLINIC | Facility: CLINIC | Age: 55
End: 2025-06-24
Payer: MEDICARE

## 2025-06-24 VITALS
DIASTOLIC BLOOD PRESSURE: 78 MMHG | WEIGHT: 248 LBS | SYSTOLIC BLOOD PRESSURE: 123 MMHG | RESPIRATION RATE: 16 BRPM | HEIGHT: 72 IN | HEART RATE: 55 BPM | BODY MASS INDEX: 33.59 KG/M2

## 2025-06-24 DIAGNOSIS — M25.561 PAIN AND SWELLING OF RIGHT KNEE: Primary | ICD-10-CM

## 2025-06-24 DIAGNOSIS — M25.461 PAIN AND SWELLING OF RIGHT KNEE: Primary | ICD-10-CM

## 2025-06-24 DIAGNOSIS — E78.2 MIXED HYPERLIPIDEMIA: ICD-10-CM

## 2025-06-24 DIAGNOSIS — R73.09 ELEVATED GLUCOSE: ICD-10-CM

## 2025-06-24 DIAGNOSIS — F32.1 MAJOR DEPRESSIVE DISORDER, SINGLE EPISODE, MODERATE (HCC): ICD-10-CM

## 2025-06-24 PROCEDURE — 99214 OFFICE O/P EST MOD 30 MIN: CPT | Performed by: NURSE PRACTITIONER

## 2025-06-24 SDOH — ECONOMIC STABILITY: FOOD INSECURITY: WITHIN THE PAST 12 MONTHS, THE FOOD YOU BOUGHT JUST DIDN'T LAST AND YOU DIDN'T HAVE MONEY TO GET MORE.: NEVER TRUE

## 2025-06-24 SDOH — ECONOMIC STABILITY: FOOD INSECURITY: WITHIN THE PAST 12 MONTHS, YOU WORRIED THAT YOUR FOOD WOULD RUN OUT BEFORE YOU GOT MONEY TO BUY MORE.: NEVER TRUE

## 2025-06-24 ASSESSMENT — PATIENT HEALTH QUESTIONNAIRE - PHQ9: DEPRESSION UNABLE TO ASSESS: PT REFUSES

## 2025-06-24 ASSESSMENT — ENCOUNTER SYMPTOMS
SHORTNESS OF BREATH: 0
ABDOMINAL PAIN: 0

## 2025-06-24 NOTE — PROGRESS NOTES
Ti Marshall is a 55 y.o. male presents for    Chief Complaint   Patient presents with    Follow-up    .    ASSESSMENT and PLAN  Ti was seen today for follow-up.    Diagnoses and all orders for this visit:    Pain and swelling of right knee  Comments:  will check xray  Orders:  -     XR KNEE RIGHT (3 VIEWS)    Elevated glucose  -     Hemoglobin A1C    Mixed hyperlipidemia  -     Comprehensive Metabolic Panel  -     CBC  -     Lipid Panel    Major depressive disorder, single episode, moderate (HCC)  Comments:  symptoms are well controlled on             HISTORY OF PRESENT ILLNESS  Ti Marshall is a 55 y.o. male presents for    Chief Complaint   Patient presents with    Follow-up    .    Patient reports right knee pain/swelling for months. Reports he had injury when playing basketball years ago    Anxiety and depression: patient and wife report his anxiety and depression are well controlled on the wellbutrin and sertraline. Patient reports has long as he takes the medication, he is good.    Vitals:    06/24/25 1450   BP: 123/78   BP Site: Right Upper Arm   Patient Position: Sitting   BP Cuff Size: Large Adult   Pulse: 55   Resp: 16   TempSrc: Oral   Weight: 112.5 kg (248 lb)   Height: 1.829 m (6')     Patient Active Problem List   Diagnosis    History of COVID-19    Requires continuous at home supplemental oxygen    Colonoscopy refused    Nonintractable episodic headache    Current moderate episode of major depressive disorder without prior episode (HCC)    COVID-19 long hauler manifesting chronic dyspnea    Debility    Chronic bilateral low back pain without sciatica    Lipoma of torso    Symptomatic bradycardia     Patient Active Problem List    Diagnosis Date Noted    Lipoma of torso 10/24/2024    Symptomatic bradycardia 10/24/2024    Chronic bilateral low back pain without sciatica 08/29/2023    Current moderate episode of major depressive disorder without prior episode (HCC) 01/31/2023    COVID-19 long

## 2025-06-24 NOTE — PROGRESS NOTES
Have you been to the ER, urgent care clinic since your last visit?  Hospitalized since your last visit?   NO    Have you seen or consulted any other health care providers outside our system since your last visit?   NO    Chief Complaint   Patient presents with    Follow-up         /78 (BP Site: Right Upper Arm, Patient Position: Sitting, BP Cuff Size: Large Adult)   Pulse 55   Resp 16   Ht 1.829 m (6')   Wt 112.5 kg (248 lb)   BMI 33.63 kg/m²

## 2025-06-25 ENCOUNTER — TELEPHONE (OUTPATIENT)
Dept: PRIMARY CARE CLINIC | Facility: CLINIC | Age: 55
End: 2025-06-25

## 2025-06-25 LAB
ALBUMIN SERPL-MCNC: 4.3 G/DL (ref 3.8–4.9)
ALP SERPL-CCNC: 82 IU/L (ref 44–121)
ALT SERPL-CCNC: 19 IU/L (ref 0–44)
AST SERPL-CCNC: 20 IU/L (ref 0–40)
BILIRUB SERPL-MCNC: 0.3 MG/DL (ref 0–1.2)
BUN SERPL-MCNC: 14 MG/DL (ref 6–24)
BUN/CREAT SERPL: 12 (ref 9–20)
CALCIUM SERPL-MCNC: 9.9 MG/DL (ref 8.7–10.2)
CHLORIDE SERPL-SCNC: 104 MMOL/L (ref 96–106)
CHOLEST SERPL-MCNC: 168 MG/DL (ref 100–199)
CO2 SERPL-SCNC: 26 MMOL/L (ref 20–29)
CREAT SERPL-MCNC: 1.2 MG/DL (ref 0.76–1.27)
EGFRCR SERPLBLD CKD-EPI 2021: 71 ML/MIN/1.73
ERYTHROCYTE [DISTWIDTH] IN BLOOD BY AUTOMATED COUNT: 12.7 % (ref 11.6–15.4)
GLOBULIN SER CALC-MCNC: 2.5 G/DL (ref 1.5–4.5)
GLUCOSE SERPL-MCNC: 76 MG/DL (ref 70–99)
HCT VFR BLD AUTO: 39.1 % (ref 37.5–51)
HDLC SERPL-MCNC: 47 MG/DL
HGB BLD-MCNC: 12.9 G/DL (ref 13–17.7)
LDLC SERPL CALC-MCNC: 97 MG/DL (ref 0–99)
MCH RBC QN AUTO: 31.8 PG (ref 26.6–33)
MCHC RBC AUTO-ENTMCNC: 33 G/DL (ref 31.5–35.7)
MCV RBC AUTO: 96 FL (ref 79–97)
PLATELET # BLD AUTO: 172 X10E3/UL (ref 150–450)
POTASSIUM SERPL-SCNC: 4 MMOL/L (ref 3.5–5.2)
PROT SERPL-MCNC: 6.8 G/DL (ref 6–8.5)
RBC # BLD AUTO: 4.06 X10E6/UL (ref 4.14–5.8)
SODIUM SERPL-SCNC: 142 MMOL/L (ref 134–144)
TRIGL SERPL-MCNC: 138 MG/DL (ref 0–149)
VLDLC SERPL CALC-MCNC: 24 MG/DL (ref 5–40)
WBC # BLD AUTO: 6.3 X10E3/UL (ref 3.4–10.8)

## 2025-06-27 LAB
EST. AVERAGE GLUCOSE BLD GHB EST-MCNC: 128 MG/DL
HBA1C MFR BLD: 6.1 %HB

## 2025-06-29 ENCOUNTER — RESULTS FOLLOW-UP (OUTPATIENT)
Dept: PRIMARY CARE CLINIC | Facility: CLINIC | Age: 55
End: 2025-06-29

## 2025-06-30 ENCOUNTER — HOSPITAL ENCOUNTER (OUTPATIENT)
Facility: HOSPITAL | Age: 55
Discharge: HOME | End: 2025-07-03
Payer: MEDICARE

## 2025-06-30 PROCEDURE — 73562 X-RAY EXAM OF KNEE 3: CPT
